# Patient Record
Sex: FEMALE | Race: WHITE | NOT HISPANIC OR LATINO | URBAN - METROPOLITAN AREA
[De-identification: names, ages, dates, MRNs, and addresses within clinical notes are randomized per-mention and may not be internally consistent; named-entity substitution may affect disease eponyms.]

---

## 2020-12-19 ENCOUNTER — EMERGENCY (EMERGENCY)
Facility: HOSPITAL | Age: 60
LOS: 0 days | Discharge: HOME | End: 2020-12-19
Attending: EMERGENCY MEDICINE | Admitting: EMERGENCY MEDICINE
Payer: MEDICAID

## 2020-12-19 VITALS
RESPIRATION RATE: 20 BRPM | HEART RATE: 108 BPM | SYSTOLIC BLOOD PRESSURE: 165 MMHG | TEMPERATURE: 97 F | OXYGEN SATURATION: 96 % | DIASTOLIC BLOOD PRESSURE: 89 MMHG | HEIGHT: 62 IN

## 2020-12-19 VITALS
RESPIRATION RATE: 20 BRPM | SYSTOLIC BLOOD PRESSURE: 158 MMHG | TEMPERATURE: 97 F | OXYGEN SATURATION: 97 % | HEART RATE: 92 BPM | DIASTOLIC BLOOD PRESSURE: 67 MMHG
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
## 2020-12-19 DIAGNOSIS — F17.200 NICOTINE DEPENDENCE, UNSPECIFIED, UNCOMPLICATED: ICD-10-CM

## 2020-12-19 DIAGNOSIS — Z23 ENCOUNTER FOR IMMUNIZATION: ICD-10-CM

## 2020-12-19 DIAGNOSIS — S80.211A ABRASION, RIGHT KNEE, INITIAL ENCOUNTER: ICD-10-CM

## 2020-12-19 DIAGNOSIS — J45.909 UNSPECIFIED ASTHMA, UNCOMPLICATED: ICD-10-CM

## 2020-12-19 DIAGNOSIS — J44.9 CHRONIC OBSTRUCTIVE PULMONARY DISEASE, UNSPECIFIED: ICD-10-CM
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
## 2020-12-19 DIAGNOSIS — Z88.0 ALLERGY STATUS TO PENICILLIN: ICD-10-CM
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
## 2020-12-19 DIAGNOSIS — M25.569 PAIN IN UNSPECIFIED KNEE: ICD-10-CM

## 2020-12-19 PROCEDURE — 73562 X-RAY EXAM OF KNEE 3: CPT | Mod: 26,RT

## 2020-12-19 PROCEDURE — 99284 EMERGENCY DEPT VISIT MOD MDM: CPT

## 2020-12-19 PROCEDURE — 73590 X-RAY EXAM OF LOWER LEG: CPT | Mod: 26,RT

## 2020-12-19 PROCEDURE — 73552 X-RAY EXAM OF FEMUR 2/>: CPT | Mod: 26,RT

## 2020-12-19 RX ORDER — TETANUS TOXOID, REDUCED DIPHTHERIA TOXOID AND ACELLULAR PERTUSSIS VACCINE, ADSORBED 5; 2.5; 8; 8; 2.5 [IU]/.5ML; [IU]/.5ML; UG/.5ML; UG/.5ML; UG/.5ML
0.5 SUSPENSION INTRAMUSCULAR ONCE
Refills: 0 | Status: COMPLETED | OUTPATIENT
Start: 2020-12-19 | End: 2020-12-19

## 2020-12-19 RX ADMIN — TETANUS TOXOID, REDUCED DIPHTHERIA TOXOID AND ACELLULAR PERTUSSIS VACCINE, ADSORBED 0.5 MILLILITER(S): 5; 2.5; 8; 8; 2.5 SUSPENSION INTRAMUSCULAR at 12:03

## 2020-12-19 NOTE — ED PROVIDER NOTE - CLINICAL SUMMARY MEDICAL DECISION MAKING FREE TEXT BOX
Fall while walking and landing on right knee. Abrasion to knee, good gait. Td. x-ray neg for fracture.

## 2020-12-19 NOTE — ED ADULT NURSE NOTE - NSIMPLEMENTINTERV_GEN_ALL_ED
Implemented All Fall Risk Interventions:  [place] to call system. Call bell, personal items and telephone within reach. Instruct patient to call for assistance. Room bathroom lighting operational. Non-slip footwear when patient is off stretcher. Physically safe environment: no spills, clutter or unnecessary equipment. Stretcher in lowest position, wheels locked, appropriate side rails in place. Provide visual cue, wrist band, yellow gown, etc. Monitor gait and stability. Monitor for mental status changes and reorient to person, place, and time. Review medications for side effects contributing to fall risk. Reinforce activity limits and safety measures with patient and family.

## 2020-12-19 NOTE — ED PROVIDER NOTE - ATTENDING CONTRIBUTION TO CARE
Slipped and fell onto the right knee. Able to walk. On exam abrasion to the right knee. No issues with range of motion and normal gait. No other injury.

## 2020-12-19 NOTE — ED PROVIDER NOTE - OBJECTIVE STATEMENT
59 y/o female with a PMH of COPD, asthma, current smoker, and blood cell abnormality? (in the process of f/u with hematologist) presents to the ED for evaluation of right knee pain s/p slipping on black ice at a gas station and landing on her right knee. pt report she got up with the help of her  and has been walking around since the fall. pt cannot recall if she is up to date on tetanus. pt reports chronic sob that improves with her ventolin inhaler. pt reports weight is worse with weight bearing. pt denies numbness, tingling, LOC, head injury, chest pain, sob, abdominal pain, weakness.

## 2020-12-19 NOTE — ED PROVIDER NOTE - NSFOLLOWUPCLINICS_GEN_ALL_ED_FT
[facility]  Orthpedic  [address], [place]   Phone: [phone]  Fax:   Follow Up Time: 1-3 Days

## 2020-12-19 NOTE — ED PROVIDER NOTE - PATIENT PORTAL LINK FT
You can access the FollowMyHealth Patient Portal offered by Health system by registering at the following website: [email or web address]. By joining [facility]’s FollowMyHealth portal, you will also be able to view your health information using other applications (apps) compatible with our system.

## 2020-12-19 NOTE — ED PROVIDER NOTE - PHYSICAL EXAMINATION
Physical Exam    Vital Signs: I have reviewed the initial vital signs.  Constitutional: well-nourished, appears stated age, no acute distress  Eyes: Conjunctiva pink, Sclera clear  Cardiovascular: S1 and S2, regular rate, regular rhythm, well-perfused extremities, radial pulses equal and 2+ b/l.   Respiratory: unlabored respiratory effort, clear to auscultation bilaterally no wheezing, rales and rhonchi. pt is speaking full sentences. no accessory muscle use.   Gastrointestinal: soft, non-tender, nondistended abdomen, no pulsatile mass, normal bowl sounds, no rebound, no guarding, no organomegaly  Musculoskeletal: FROM of b/l upper and lower extremities. (+) tenderness over the right patella, and pain with extension of right knee. no effusion. negative yecenia. no joint laxity.   Integumentary: warm, dry, no rash. (+) abrasion to right knee.   Neurologic: awake, alert, cranial nerves II-XII grossly intact, extremities’ motor and sensory functions grossly intact. finger to nose intact. steady gait.   Psychiatric: appropriate mood, appropriate affect

## 2020-12-19 NOTE — ED PROVIDER NOTE - PROGRESS NOTE DETAILS
FF: discussed radiology results. tdap given. abrasion cleansed and dressed. bacitracin given. advised of return precautions discussed at bedside. agreeable to ed and f/u with ortho.

## 2020-12-19 NOTE — ED PROVIDER NOTE - NSFOLLOWUPINSTRUCTIONS_ED_ALL_ED_FT
Knee Pain    WHAT YOU NEED TO KNOW:    Knee pain may start suddenly, or it may be a long-term problem. You may have pain on the side, front, or back of your knee. You may have knee stiffness and swelling. You may hear popping sounds or feel like your knee is giving way or locking up as you walk. You may feel pain when you sit, stand, walk, or climb up and down stairs. Knee pain can be caused by conditions such as obesity, inflammation, or strains or tears in ligaments or tendons.     DISCHARGE INSTRUCTIONS:    Return to the emergency department if:     Your pain is worse, even after treatment.       You cannot bend or straighten your leg completely.       The swelling around your knee does not go down even with treatment.      Your knee is painful and hot to the touch.     Contact your healthcare provider if:     You have questions or concerns about your condition or care.         Medicines: You may need any of the following:     NSAIDs help decrease swelling and pain or fever. This medicine is available with or without a doctor's order. NSAIDs can cause stomach bleeding or kidney problems in certain people. If you take blood thinner medicine, always ask your healthcare provider if NSAIDs are safe for you. Always read the medicine label and follow directions.      Acetaminophen decreases pain and fever. It is available without a doctor's order. Ask how much to take and how often to take it. Follow directions. Read the labels of all other medicines you are using to see if they also contain acetaminophen, or ask your doctor or pharmacist. Acetaminophen can cause liver damage if not taken correctly. Do not use more than 4 grams (4,000 milligrams) total of acetaminophen in one day.       Prescription pain medicine may be given. Ask your healthcare provider how to take this medicine safely. Some prescription pain medicines contain acetaminophen. Do not take other medicines that contain acetaminophen without talking to your healthcare provider. Too much acetaminophen may cause liver damage. Prescription pain medicine may cause constipation. Ask your healthcare provider how to prevent or treat constipation.       Take your medicine as directed. Contact your healthcare provider if you think your medicine is not helping or if you have side effects. Tell him or her if you are allergic to any medicine. Keep a list of the medicines, vitamins, and herbs you take. Include the amounts, and when and why you take them. Bring the list or the pill bottles to follow-up visits. Carry your medicine list with you in case of an emergency.    What you can do to manage your symptoms:     Rest your knee so it can heal. Limit activities that increase your pain. Do low-impact exercises, such as walking or swimming.       Apply ice to help reduce swelling and pain. Use an ice pack, or put crushed ice in a plastic bag. Cover it with a towel before you apply it to your knee. Apply ice for 15 to 20 minutes every hour, or as directed.      Apply compression to help reduce swelling. Use a brace or bandage only as directed.      Elevate your knee to help decrease pain and swelling. Elevate your knee while you are sitting or lying down. Prop your leg on pillows to keep your knee above the level of your heart.      Prevent your knee from moving as directed. Your healthcare provider may put on a cast or splint. You may need to wear a leg brace to stabilize your knee. A leg brace can be adjusted to increase your range of motion as your knee heals.Hinged Knee Braces          What you can do to prevent knee pain:     Maintain a healthy weight. Extra weight increases your risk for knee pain. Ask your healthcare provider how much you should weigh. He or she can help you create a safe weight loss plan if you need to lose weight.      Exercise or train properly. Use the correct equipment for sports. Wear shoes that provide good support. Check your posture often as you exercise, play sports, or train for an event. This can help prevent stress and strain on your knees. Rest between sessions so you do not overwork your knees.    Follow up with your healthcare provider within 24 hours or as directed: You may need follow-up treatments, such as steroid injections to decrease pain. Write down your questions so you remember to ask them during your visits.        © Copyright [employer] 2019 All illustrations and images included in CareNotes are the copyrighted property of [employer] or [employer].     Abrasion  ImageAn abrasion is a cut or a scrape on the outer surface of the skin. An abrasion does not go through all the layers of the skin. It is important to care for your abrasion properly to prevent infection.    What are the causes?  This condition is caused by falling on or gliding across the ground or another surface. When your skin rubs on something, the outer and inner layers of skin may rub off.    What are the signs or symptoms?  The main symptom of this condition is a cut or a scrape. The scrape may be bleeding, or it may appear red or pink. If the abrasion was caused by a fall, there may be a bruise under the cut or scrape.    How is this diagnosed?  An abrasion is diagnosed with a physical exam.    How is this treated?  Treatment for this condition depends on how large and deep the abrasion is. In most cases:    Your abrasion will be cleaned with water and mild soap. This is done to remove any dirt or debris (such as particles of glass or rock) that may be stuck in the wound.  An antibiotic ointment may be applied to the abrasion to help prevent infection.  A bandage (dressing) may be placed on the abrasion to keep it clean.    You may also need a tetanus shot.    Follow these instructions at home:  Medicines     Take or apply over-the-counter and prescription medicines only as told by your health care provider.  If you were prescribed an antibiotic medicine, apply it as told by your health care provider.  Wound care     Clean the wound 2–3 times a day, or as directed by your health care provider. To do this, wash the wound with mild soap and water, rinse off the soap, and pat the wound dry with a clean towel. Do not rub the wound.  Keep the dressing clean and dry as told by your health care provider.  There are many different ways to close and cover a wound. Follow instructions from your health care provider about:    Caring for your wound.  Changing and removing your dressing. You may have to change your dressing one or more times a day, or as directed by your health care provider.    Check your wound every day for signs of infection. Check for:    Redness, particularly a red streak that spreads out from the wound.  Swelling or increased pain.  Warmth.  Fluid, pus, or a bad smell.    If directed, put ice on the injured area to reduce pain and swelling:    Put ice in a plastic bag.   Place a towel between your skin and the bag.   Leave the ice on for 20 minutes, 2–3 times a day.    General instructions     Do not take baths, swim, or use a hot tub until your health care provider says it is okay to do so.  If possible, raise (elevate) the injured area above the level of your heart while you are sitting or lying down. This will reduce pain and swelling.  Keep all follow-up visits as directed by your health care provider. This is important.  Contact a health care provider if:  You received a tetanus shot, and you have swelling, severe pain, redness, or bleeding at the injection site.  Your pain is not controlled with medicine.  You have redness, swelling, or more pain at the site of your wound.  Get help right away if:  You have a red streak spreading away from your wound.  You have a fever.  You have fluid, blood, or pus coming from your wound.  You notice a bad smell coming from your wound or your dressing.  Summary  An abrasion is a cut or a scrape on the outer surface of the skin. An abrasion does not go through all the layers of the skin.  Care for your abrasion properly to prevent infection.  Clean the wound with mild soap and water 2–3 times a day. Follow instructions from your health care provider about taking medicines and changing your bandage (dressing).  Contact your health care provider if you have redness, swelling or more pain in the wound area.  Get help right away if you have a fever or if you have fluid, blood, pus, a bad smell, or a red streak coming from the wound.  This information is not intended to replace advice given to you by your health care provider. Make sure you discuss any questions you have with your health care provider.

## 2020-12-19 NOTE — ED PROVIDER NOTE - CARE PLAN
Principal Discharge DX:	Knee injury, right, initial encounter  Secondary Diagnosis:	Abrasion  Secondary Diagnosis:	Need for Tdap vaccination

## 2020-12-19 NOTE — ED PROVIDER NOTE - CARE PROVIDER_API CALL
Dinesh Mukherjee)  Orthopaedic Surgery  [address] [place] [address]  Phone: [phone]  Fax: [phone]  Follow Up Time: 1-3 Days

## 2024-04-16 ENCOUNTER — INPATIENT (INPATIENT)
Facility: HOSPITAL | Age: 64
LOS: 3 days | Discharge: HOME | DRG: 556 | End: 2024-04-20
Attending: INTERNAL MEDICINE | Admitting: STUDENT IN AN ORGANIZED HEALTH CARE EDUCATION/TRAINING PROGRAM
Payer: MEDICARE

## 2024-04-16 VITALS
OXYGEN SATURATION: 95 % | WEIGHT: 149.91 LBS | TEMPERATURE: 98 F | HEART RATE: 62 BPM | SYSTOLIC BLOOD PRESSURE: 183 MMHG | RESPIRATION RATE: 18 BRPM | DIASTOLIC BLOOD PRESSURE: 109 MMHG

## 2024-04-16 DIAGNOSIS — R53.1 WEAKNESS: ICD-10-CM

## 2024-04-16 LAB
ALBUMIN SERPL ELPH-MCNC: 3.1 G/DL — LOW (ref 3.5–5.2)
ALP SERPL-CCNC: 78 U/L — SIGNIFICANT CHANGE UP (ref 30–115)
ALT FLD-CCNC: 24 U/L — SIGNIFICANT CHANGE UP (ref 0–41)
ANION GAP SERPL CALC-SCNC: 15 MMOL/L — HIGH (ref 7–14)
ANION GAP SERPL CALC-SCNC: 9 MMOL/L — SIGNIFICANT CHANGE UP (ref 7–14)
AST SERPL-CCNC: 50 U/L — HIGH (ref 0–41)
BASOPHILS # BLD AUTO: 0.06 K/UL — SIGNIFICANT CHANGE UP (ref 0–0.2)
BASOPHILS NFR BLD AUTO: 0.5 % — SIGNIFICANT CHANGE UP (ref 0–1)
BILIRUB SERPL-MCNC: 0.5 MG/DL — SIGNIFICANT CHANGE UP (ref 0.2–1.2)
BUN SERPL-MCNC: 16 MG/DL — SIGNIFICANT CHANGE UP (ref 10–20)
BUN SERPL-MCNC: 17 MG/DL — SIGNIFICANT CHANGE UP (ref 10–20)
CALCIUM SERPL-MCNC: 8.5 MG/DL — SIGNIFICANT CHANGE UP (ref 8.4–10.5)
CALCIUM SERPL-MCNC: 9 MG/DL — SIGNIFICANT CHANGE UP (ref 8.4–10.5)
CHLORIDE SERPL-SCNC: 106 MMOL/L — SIGNIFICANT CHANGE UP (ref 98–110)
CHLORIDE SERPL-SCNC: 99 MMOL/L — SIGNIFICANT CHANGE UP (ref 98–110)
CO2 SERPL-SCNC: 23 MMOL/L — SIGNIFICANT CHANGE UP (ref 17–32)
CO2 SERPL-SCNC: 25 MMOL/L — SIGNIFICANT CHANGE UP (ref 17–32)
CREAT SERPL-MCNC: 0.7 MG/DL — SIGNIFICANT CHANGE UP (ref 0.7–1.5)
CREAT SERPL-MCNC: 0.8 MG/DL — SIGNIFICANT CHANGE UP (ref 0.7–1.5)
EGFR: 82 ML/MIN/1.73M2 — SIGNIFICANT CHANGE UP
EGFR: 96 ML/MIN/1.73M2 — SIGNIFICANT CHANGE UP
EOSINOPHIL # BLD AUTO: 0.12 K/UL — SIGNIFICANT CHANGE UP (ref 0–0.7)
EOSINOPHIL NFR BLD AUTO: 1 % — SIGNIFICANT CHANGE UP (ref 0–8)
GLUCOSE SERPL-MCNC: 74 MG/DL — SIGNIFICANT CHANGE UP (ref 70–99)
GLUCOSE SERPL-MCNC: 79 MG/DL — SIGNIFICANT CHANGE UP (ref 70–99)
HCT VFR BLD CALC: 39.2 % — SIGNIFICANT CHANGE UP (ref 37–47)
HGB BLD-MCNC: 14.1 G/DL — SIGNIFICANT CHANGE UP (ref 12–16)
IMM GRANULOCYTES NFR BLD AUTO: 0.3 % — SIGNIFICANT CHANGE UP (ref 0.1–0.3)
LYMPHOCYTES # BLD AUTO: 28.2 % — SIGNIFICANT CHANGE UP (ref 20.5–51.1)
LYMPHOCYTES # BLD AUTO: 3.37 K/UL — SIGNIFICANT CHANGE UP (ref 1.2–3.4)
MAGNESIUM SERPL-MCNC: 2.1 MG/DL — SIGNIFICANT CHANGE UP (ref 1.8–2.4)
MCHC RBC-ENTMCNC: 36 G/DL — SIGNIFICANT CHANGE UP (ref 32–37)
MCHC RBC-ENTMCNC: 38.1 PG — HIGH (ref 27–31)
MCV RBC AUTO: 105.9 FL — HIGH (ref 81–99)
MONOCYTES # BLD AUTO: 0.8 K/UL — HIGH (ref 0.1–0.6)
MONOCYTES NFR BLD AUTO: 6.7 % — SIGNIFICANT CHANGE UP (ref 1.7–9.3)
NEUTROPHILS # BLD AUTO: 7.56 K/UL — HIGH (ref 1.4–6.5)
NEUTROPHILS NFR BLD AUTO: 63.3 % — SIGNIFICANT CHANGE UP (ref 42.2–75.2)
NRBC # BLD: 0 /100 WBCS — SIGNIFICANT CHANGE UP (ref 0–0)
PLATELET # BLD AUTO: 345 K/UL — SIGNIFICANT CHANGE UP (ref 130–400)
PMV BLD: 9.1 FL — SIGNIFICANT CHANGE UP (ref 7.4–10.4)
POTASSIUM SERPL-MCNC: 3.5 MMOL/L — SIGNIFICANT CHANGE UP (ref 3.5–5)
POTASSIUM SERPL-MCNC: 5.8 MMOL/L — HIGH (ref 3.5–5)
POTASSIUM SERPL-SCNC: 3.5 MMOL/L — SIGNIFICANT CHANGE UP (ref 3.5–5)
POTASSIUM SERPL-SCNC: 5.8 MMOL/L — HIGH (ref 3.5–5)
PROT SERPL-MCNC: 7 G/DL — SIGNIFICANT CHANGE UP (ref 6–8)
RBC # BLD: 3.7 M/UL — LOW (ref 4.2–5.4)
RBC # FLD: 13.4 % — SIGNIFICANT CHANGE UP (ref 11.5–14.5)
SODIUM SERPL-SCNC: 137 MMOL/L — SIGNIFICANT CHANGE UP (ref 135–146)
SODIUM SERPL-SCNC: 140 MMOL/L — SIGNIFICANT CHANGE UP (ref 135–146)
TROPONIN T, HIGH SENSITIVITY RESULT: 16 NG/L — HIGH (ref 6–13)
TROPONIN T, HIGH SENSITIVITY RESULT: 17 NG/L — HIGH (ref 6–13)
WBC # BLD: 11.95 K/UL — HIGH (ref 4.8–10.8)
WBC # FLD AUTO: 11.95 K/UL — HIGH (ref 4.8–10.8)

## 2024-04-16 PROCEDURE — 72146 MRI CHEST SPINE W/O DYE: CPT | Mod: MC

## 2024-04-16 PROCEDURE — 97162 PT EVAL MOD COMPLEX 30 MIN: CPT | Mod: GP

## 2024-04-16 PROCEDURE — 83516 IMMUNOASSAY NONANTIBODY: CPT

## 2024-04-16 PROCEDURE — 73030 X-RAY EXAM OF SHOULDER: CPT | Mod: 26,RT

## 2024-04-16 PROCEDURE — 85027 COMPLETE CBC AUTOMATED: CPT

## 2024-04-16 PROCEDURE — 83735 ASSAY OF MAGNESIUM: CPT

## 2024-04-16 PROCEDURE — 97116 GAIT TRAINING THERAPY: CPT | Mod: GP

## 2024-04-16 PROCEDURE — 82085 ASSAY OF ALDOLASE: CPT

## 2024-04-16 PROCEDURE — 36415 COLL VENOUS BLD VENIPUNCTURE: CPT

## 2024-04-16 PROCEDURE — 86803 HEPATITIS C AB TEST: CPT

## 2024-04-16 PROCEDURE — 86038 ANTINUCLEAR ANTIBODIES: CPT

## 2024-04-16 PROCEDURE — 86381 MITOCHONDRIAL ANTIBODY EACH: CPT

## 2024-04-16 PROCEDURE — 83615 LACTATE (LD) (LDH) ENZYME: CPT

## 2024-04-16 PROCEDURE — 80053 COMPREHEN METABOLIC PANEL: CPT

## 2024-04-16 PROCEDURE — 85025 COMPLETE CBC W/AUTO DIFF WBC: CPT

## 2024-04-16 PROCEDURE — 80048 BASIC METABOLIC PNL TOTAL CA: CPT

## 2024-04-16 PROCEDURE — 72170 X-RAY EXAM OF PELVIS: CPT | Mod: 26

## 2024-04-16 PROCEDURE — 72141 MRI NECK SPINE W/O DYE: CPT | Mod: MC

## 2024-04-16 PROCEDURE — 71045 X-RAY EXAM CHEST 1 VIEW: CPT | Mod: 26

## 2024-04-16 PROCEDURE — 82550 ASSAY OF CK (CPK): CPT

## 2024-04-16 PROCEDURE — 99285 EMERGENCY DEPT VISIT HI MDM: CPT | Mod: FS

## 2024-04-16 PROCEDURE — 70450 CT HEAD/BRAIN W/O DYE: CPT | Mod: 26,MC

## 2024-04-16 PROCEDURE — 84100 ASSAY OF PHOSPHORUS: CPT

## 2024-04-16 PROCEDURE — 86235 NUCLEAR ANTIGEN ANTIBODY: CPT

## 2024-04-16 PROCEDURE — 99222 1ST HOSP IP/OBS MODERATE 55: CPT

## 2024-04-16 PROCEDURE — 72148 MRI LUMBAR SPINE W/O DYE: CPT | Mod: MC

## 2024-04-16 RX ORDER — SODIUM CHLORIDE 9 MG/ML
1000 INJECTION INTRAMUSCULAR; INTRAVENOUS; SUBCUTANEOUS ONCE
Refills: 0 | Status: COMPLETED | OUTPATIENT
Start: 2024-04-16 | End: 2024-04-16

## 2024-04-16 RX ORDER — ONDANSETRON 8 MG/1
4 TABLET, FILM COATED ORAL EVERY 8 HOURS
Refills: 0 | Status: DISCONTINUED | OUTPATIENT
Start: 2024-04-16 | End: 2024-04-20

## 2024-04-16 RX ORDER — ACETAMINOPHEN 500 MG
650 TABLET ORAL EVERY 6 HOURS
Refills: 0 | Status: DISCONTINUED | OUTPATIENT
Start: 2024-04-16 | End: 2024-04-20

## 2024-04-16 RX ORDER — ALBUTEROL 90 UG/1
2 AEROSOL, METERED ORAL EVERY 6 HOURS
Refills: 0 | Status: DISCONTINUED | OUTPATIENT
Start: 2024-04-16 | End: 2024-04-20

## 2024-04-16 RX ORDER — LANOLIN ALCOHOL/MO/W.PET/CERES
3 CREAM (GRAM) TOPICAL AT BEDTIME
Refills: 0 | Status: DISCONTINUED | OUTPATIENT
Start: 2024-04-16 | End: 2024-04-20

## 2024-04-16 RX ORDER — HEPARIN SODIUM 5000 [USP'U]/ML
5000 INJECTION INTRAVENOUS; SUBCUTANEOUS EVERY 12 HOURS
Refills: 0 | Status: DISCONTINUED | OUTPATIENT
Start: 2024-04-16 | End: 2024-04-20

## 2024-04-16 RX ORDER — INFLUENZA VIRUS VACCINE 15; 15; 15; 15 UG/.5ML; UG/.5ML; UG/.5ML; UG/.5ML
0.7 SUSPENSION INTRAMUSCULAR ONCE
Refills: 0 | Status: COMPLETED | OUTPATIENT
Start: 2024-04-16 | End: 2024-04-16

## 2024-04-16 RX ADMIN — SODIUM CHLORIDE 2000 MILLILITER(S): 9 INJECTION INTRAMUSCULAR; INTRAVENOUS; SUBCUTANEOUS at 14:51

## 2024-04-16 NOTE — H&P ADULT - HISTORY OF PRESENT ILLNESS
Patient is a 64yo female PMHx COPD, asthma, HLD presenting to the ER with "weakness" for the past couple months. Patient states she has been feeling lower extremity weakness with since January.  Weakness has progressed over time, leading to mechanical falls that now occur "every day." Patient remains conscious with all falls except for her first one months ago.  She states she has hit her head on multiple occasions.  She struggles to ambulate at home unless she has assistance, and leading to more bed-bound state.  Patient also admits to increased lightheadedness. "Something weird in my head" and "feels like theres water in her legs."  She denies headaches, changes in vision, chest pain.     Patient has not been to doctor for this before.  Boyfriend called ambulance today for hospital evaluation   Patient is a 62yo F w/ PMHx of COPD - not on home O2, asthma, presenting to the ER with weakness and recurrent falls for the past couple of months. Patient states she has been feeling worsening lower extremity weakness since January, leading to recurrent mechanical falls that now occur "every other day." Patient remains conscious with all falls except for her first one months ago.  She states she has hit her head on multiple occasions.  She struggles to ambulate at home unless she has assistance, and leading to more bed-bound state.  Patient also admits to increased lightheadedness. Reports feeling like "Something weird in my head". Reports her boyfriend called EMS today as he is unable to care for her anymore and is worried about her. Endorses pain at the tail bone. She denies headaches, changes in vision, chest pain, nausea, vomiting, sob, abdominal pain, changes in BMs, or any urinary sxs, LE numbness, tingling.

## 2024-04-16 NOTE — ED PROVIDER NOTE - ATTENDING APP SHARED VISIT CONTRIBUTION OF CARE
63yF p/w frequent falls at home with generalized weakness.  Also c/o pain but cannot explain exactly where.

## 2024-04-16 NOTE — H&P ADULT - NSHPSOCIALHISTORY_GEN_ALL_CORE
Patient lives with her boyfriend.  She   She struggles to ambulate on her own.  She uses a chair for the shower.  She does not use a walker.    Patient reports smoking ppd over 50 years.  Reports used to be a heavy drinker, quit a year ago.  Reports history of pot, cocaine, and marijuana use but has quit use.  Currently denies drug or alcohol abuse Patient resides at home with her boyfriend.    She struggles to ambulate on her own.  She uses a chair for the shower.  She does not use a walker.    Patient reports smoking 1 ppd over 50 years which she quit a month ago, now smoker 1-2 cigarettes per day.  Reports used to be a heavy drinker, quit a year ago.  Reports history of using pot, cocaine, and marijuana but quit a few years ago.  Currently denies drug or alcohol abuse

## 2024-04-16 NOTE — H&P ADULT - NSHPLABSRESULTS_GEN_ALL_CORE
14.1   11.95 )-----------( 345      ( 16 Apr 2024 14:24 )             39.2       04-16    137  |  99  |  17  ----------------------------<  79  5.8<H>   |  23  |  0.8    Ca    9.0      16 Apr 2024 14:24  Mg     2.1     04-16    TPro  7.0  /  Alb  3.1<L>  /  TBili  0.5  /  DBili  x   /  AST  50<H>  /  ALT  24  /  AlkPhos  78  04-16       Magnesium: 2.1 mg/dL (04-16-24 @ 14:24)    Urinalysis Basic - ( 16 Apr 2024 14:24 )    Color: x / Appearance: x / SG: x / pH: x  Gluc: 79 mg/dL / Ketone: x  / Bili: x / Urobili: x   Blood: x / Protein: x / Nitrite: x   Leuk Esterase: x / RBC: x / WBC x   Sq Epi: x / Non Sq Epi: x / Bacteria: x    Lactate Trend    from: Xray Pelvis AP only (04.16.24 @ 14:54) >  IMPRESSION:  No acute fracture or dislocation.  Degenerative changes of the lower lumbar spine.    from: Xray Chest 1 View- PORTABLE-Urgent (04.16.24 @ 14:54) >  Impression:  Soft tissue density medial to the right proximal humerus of unknown   significance. Recommend follow-up dedicated right shoulder radiograph.    from: CT Head No Cont (04.16.24 @ 15:20) >  IMPRESSION:  1.  No evidence of acute intracranial pathology.  2.  Severe chronic microvascular changes.

## 2024-04-16 NOTE — ED PROVIDER NOTE - PROGRESS NOTE DETAILS
Patient is admitted for ambulatory dysfunction. Pt is aware of the plan and agrees. Pt has been endorsed to hospitalist.

## 2024-04-16 NOTE — H&P ADULT - NSHPPHYSICALEXAM_GEN_ALL_CORE
PHYSICAL EXAM:   Vital Signs:  Vital Signs Last 24 Hrs  T(C): 36.7 (16 Apr 2024 14:07), Max: 36.7 (16 Apr 2024 14:07)  T(F): 98 (16 Apr 2024 14:07), Max: 98 (16 Apr 2024 14:07)  HR: 71 (16 Apr 2024 18:56) (62 - 71)  BP: 164/94 (16 Apr 2024 18:56) (164/94 - 183/109)  RR: 18 (16 Apr 2024 18:56) (18 - 18)  SpO2: 98% (16 Apr 2024 18:56) (95% - 98%)    Parameters below as of 16 Apr 2024 18:56  Patient On (Oxygen Delivery Method): room air    GENERAL:  Patient is a 64 yo female laying in hospital bed.   HEENT:  NC/AT,  conjunctivae clear and pink, no nodules, adenopathy  CHEST:  Full & symmetric excursion, no increased effort, breath sounds clear  HEART:  Regular rhythm, S1, S2, no murmur/rub/S3/S4, no abdominal bruit, no edema  ABDOMEN:  Soft, non-tender, non-distended, normoactive bowel sounds,  no masses   EXTEREMITIES:  no cyanosis or edema.  Tremor noted of the upper extremities, more prominent LUE.    SKIN:  No rash/erythema/ecchymoses/petechiae/wounds/abscess/warm/dry  NEURO:  Awake, alert, and oriented x 3 PHYSICAL EXAM:   Vital Signs:  Vital Signs Last 24 Hrs  T(C): 36.7 (16 Apr 2024 14:07), Max: 36.7 (16 Apr 2024 14:07)  T(F): 98 (16 Apr 2024 14:07), Max: 98 (16 Apr 2024 14:07)  HR: 71 (16 Apr 2024 18:56) (62 - 71)  BP: 164/94 (16 Apr 2024 18:56) (164/94 - 183/109)  RR: 18 (16 Apr 2024 18:56) (18 - 18)  SpO2: 98% (16 Apr 2024 18:56) (95% - 98%)    Parameters below as of 16 Apr 2024 18:56  Patient On (Oxygen Delivery Method): room air    GENERAL:  Patient is a 62 yo female laying in hospital bed.   HEENT:  NC/AT,  conjunctivae clear and pink, no nodules, adenopathy  CHEST:  Full & symmetric excursion, no increased effort, breath sounds clear  HEART:  Regular rhythm, S1, S2  ABDOMEN:  Soft, non-tender, non-distended, normoactive bowel sounds,  no masses   EXTREMITIES:  no cyanosis or edema. No swelling in LEs, no calf tenderness, peripheral pulses present. Resulting upper extremities tremors noted, more prominent LUE.    MSK: no spinal tenderness   SKIN:  No rash/erythema/ecchymoses/petechiae/wounds/abscess/warm/dry  NEURO:  Awake, alert, and oriented x 3

## 2024-04-16 NOTE — ED PROVIDER NOTE - DIFFERENTIAL DIAGNOSIS
fx, dislocation, sprain, strain, SHAHAB, infection, electrolyte abnormality, ACS Differential Diagnosis

## 2024-04-16 NOTE — PATIENT PROFILE ADULT - OVER THE PAST TWO WEEKS, HAVE YOU FELT LITTLE INTEREST OR PLEASURE IN DOING THINGS?
Darwin Ashby,    I had the opportunity to review your recent labs and a summary of your labs reads as follows:    Your complete blood counts show no sign of anemia, normal white blood cell count and platelets.  Your comprehensive metabolic panel showed stable impaired renal function, normal liver function, and normal fasting blood glucose indicating no evidence of diabetes mellitus.  Your fasting lipid panel show  - normal HDL (good) cholesterol -as your goal is greater than 40  - elevated LDL (bad) cholesterol as your goal is less than 160 - rather than starting statin I would recommend a you look into the Mediterranean Diet - typically high in fruits, vegetables, whole grains, beans, nuts, and seeds; include olive oil as an important source of monounsaturated fat; and allow low to moderate wine consumption.  There are also typically low to moderate amounts of fish, poultry, and dairy products, with little red meat in this diet.  - normal triglyceride levels    The 10-year ASCVD risk score (Yoncallaerna ANAYA Jr., et al., 2013) is: 3.6%    Values used to calculate the score:      Age: 57 years      Sex: Female      Is Non- : No      Diabetic: No      Tobacco smoker: No      Systolic Blood Pressure: 140 mmHg      Is BP treated: No      HDL Cholesterol: 71 mg/dL      Total Cholesterol: 256 mg/dL      Sincerely,  Dann Khalil MD
no

## 2024-04-16 NOTE — ED PROVIDER NOTE - OBJECTIVE STATEMENT
63-year-old female with past medical history of COPD and asthma who presents to the ED for evaluation.  Reports that she has been having weakness and multiple falls in the past 3 months and fell against yesterday, so her boyfriend called the ambulance today to send her to the hospital for evaluation.  Also endorses lightheadedness.  Denies fever, shortness of breath, chest pain, nausea, vomiting, abdominal pain, urinary symptoms, and change with bowel movement.

## 2024-04-16 NOTE — ED PROVIDER NOTE - CLINICAL SUMMARY MEDICAL DECISION MAKING FREE TEXT BOX
63yF COPD/asthma p/w weakness, lightheadedness and now fall.  Pt nontoxic and w/o gross deformity to suggest traumatic injury.  Not on a/c.  Pt struggling to ambulate 2/2 weakness.  Labs w/ mildly elevated troponin.  Imaging with ?shoulder fx on CXR, not seen on dedicated shoulder xray as per interpretation of ED attending Klerman.  EKG w/o STEMI as per ED attending Klerman interpretation.  Given difficulty ambulating and concern for home safety, will adm for further care.

## 2024-04-16 NOTE — H&P ADULT - ASSESSMENT
Weakness  Falls  - CT head notes severe chronic microvascular changes.  Neuro consult in or outpatient   - Fall risk protocol   - PT follow up  - Social work follow up    Elevated Potassium  - hemolyzed lab work.  Follow up labs     Troponin Elevation   - Elevations noted, 17 and 16    COPD, Asthma  - continue albuterol  - not on home O2       Patient is a 64yo F w/ PMHx of COPD - not on home O2, asthma, presenting to the ER with weakness and recurrent falls for the past couple of months. Patient states she has been feeling worsening lower extremity weakness since January, leading to recurrent mechanical falls that now occur "every other day." Patient remains conscious with all falls except for her first one months ago.  She states she has hit her head on multiple occasions.  She struggles to ambulate at home unless she has assistance, and leading to more bed-bound state.  Patient also admits to increased lightheadedness. Reports feeling like "Something weird in my head". Reports her boyfriend called EMS today as he is unable to care for her anymore and is worried about her. Endorses pain at the tail bone. She denies headaches, changes in vision, chest pain, nausea, vomiting, sob, abdominal pain, changes in BMs, or any urinary sxs, LE numbness, tingling.     #Ambulatory Dysfucntion  #Weakness  #Recurrent mechanical Falls  - CT head- no acute intracranial pathology, severe chronic microvascular changes.  - XR Pelvis - No acute fx of dislocation degenerative changes on lower spine  - CXR- Prelim: Soft tissue density medial to the right proximal humerus of unknown   significance.  - FU official CXR report  - Tylenol 650mg q6 for mild pain  - Celecoxib 100mg q6 for moderate pain   - PT FU  - Social work FU   - Fall risk protocol  - Consider neurology FU       #Elevated Potassium  - hemolyzed lab work.    - Follow up repeat BMP    #COPD  #Asthma  - continue w/ albuterol q6 prn for sob/wheezing   - not on home O2    #Current smoker  - patient educated on smoking cessation  - refusing nicotine patch     Diet: Regular   Activity: OOB  VTE PPX: Lovenox    Plan Discussed and approved by attending on call.     Patient is a 64yo F w/ PMHx of COPD - not on home O2, asthma, presenting to the ER with weakness and recurrent falls for the past couple of months. Patient states she has been feeling worsening lower extremity weakness since January, leading to recurrent mechanical falls that now occur "every other day." Patient remains conscious with all falls except for her first one months ago.  She states she has hit her head on multiple occasions.  She struggles to ambulate at home unless she has assistance, and leading to more bed-bound state.  Patient also admits to increased lightheadedness. Reports feeling like "Something weird in my head". Reports her boyfriend called EMS today as he is unable to care for her anymore and is worried about her. Endorses pain at the tail bone. She denies headaches, changes in vision, chest pain, nausea, vomiting, sob, abdominal pain, changes in BMs, or any urinary sxs, LE numbness, tingling.     #Ambulatory Dysfucntion  #Weakness  #Recurrent mechanical Falls  - CT head- no acute intracranial pathology, severe chronic microvascular changes.  - XR Pelvis - No acute fx of dislocation degenerative changes on lower spine  - CXR- Prelim: Soft tissue density medial to the right proximal humerus of unknown   significance.  - xray of the right shoulder  - Tylenol 650mg q6 for mild pain  - Celecoxib 100mg q6 for moderate pain   - PT FU  - Social work FU   - Fall risk protocol  - Consider neurology FU       #Elevated Potassium  - hemolyzed lab work.    - Follow up repeat BMP    #COPD  #Asthma  - continue w/ albuterol q6 prn for sob/wheezing   - not on home O2    #Current smoker  - patient educated on smoking cessation  - refusing nicotine patch     Diet: Regular   Activity: OOB  VTE PPX: Lovenox    Plan Discussed and approved by attending on call.

## 2024-04-16 NOTE — PATIENT PROFILE ADULT - FALL HARM RISK - HARM RISK INTERVENTIONS
Assistance with ambulation/Assistance OOB with selected safe patient handling equipment/Communicate Risk of Fall with Harm to all staff/Discuss with provider need for PT consult/Monitor gait and stability/Reinforce activity limits and safety measures with patient and family/Tailored Fall Risk Interventions/Visual Cue: Yellow wristband and red socks/Bed in lowest position, wheels locked, appropriate side rails in place/Call bell, personal items and telephone in reach/Instruct patient to call for assistance before getting out of bed or chair/Non-slip footwear when patient is out of bed/[place] to call system/Physically safe environment - no spills, clutter or unnecessary equipment/Purposeful Proactive Rounding/Room/bathroom lighting operational, light cord in reach

## 2024-04-17 LAB
ANION GAP SERPL CALC-SCNC: 14 MMOL/L — SIGNIFICANT CHANGE UP (ref 7–14)
BUN SERPL-MCNC: 15 MG/DL — SIGNIFICANT CHANGE UP (ref 10–20)
CALCIUM SERPL-MCNC: 8.5 MG/DL — SIGNIFICANT CHANGE UP (ref 8.4–10.5)
CHLORIDE SERPL-SCNC: 103 MMOL/L — SIGNIFICANT CHANGE UP (ref 98–110)
CK SERPL-CCNC: 18 U/L — SIGNIFICANT CHANGE UP (ref 0–225)
CO2 SERPL-SCNC: 23 MMOL/L — SIGNIFICANT CHANGE UP (ref 17–32)
CREAT SERPL-MCNC: 0.7 MG/DL — SIGNIFICANT CHANGE UP (ref 0.7–1.5)
EGFR: 96 ML/MIN/1.73M2 — SIGNIFICANT CHANGE UP
GLUCOSE SERPL-MCNC: 62 MG/DL — LOW (ref 70–99)
HCT VFR BLD CALC: 33 % — LOW (ref 37–47)
HCV AB S/CO SERPL IA: 0.06 COI — SIGNIFICANT CHANGE UP
HCV AB SERPL-IMP: SIGNIFICANT CHANGE UP
HGB BLD-MCNC: 11.7 G/DL — LOW (ref 12–16)
LDH SERPL L TO P-CCNC: 159 — SIGNIFICANT CHANGE UP (ref 50–242)
MAGNESIUM SERPL-MCNC: 2 MG/DL — SIGNIFICANT CHANGE UP (ref 1.8–2.4)
MCHC RBC-ENTMCNC: 35.5 G/DL — SIGNIFICANT CHANGE UP (ref 32–37)
MCHC RBC-ENTMCNC: 38 PG — HIGH (ref 27–31)
MCV RBC AUTO: 107.1 FL — HIGH (ref 81–99)
NRBC # BLD: 0 /100 WBCS — SIGNIFICANT CHANGE UP (ref 0–0)
PHOSPHATE SERPL-MCNC: 3.3 MG/DL — SIGNIFICANT CHANGE UP (ref 2.1–4.9)
PLATELET # BLD AUTO: 327 K/UL — SIGNIFICANT CHANGE UP (ref 130–400)
PMV BLD: 8.8 FL — SIGNIFICANT CHANGE UP (ref 7.4–10.4)
POTASSIUM SERPL-MCNC: 3.6 MMOL/L — SIGNIFICANT CHANGE UP (ref 3.5–5)
POTASSIUM SERPL-SCNC: 3.6 MMOL/L — SIGNIFICANT CHANGE UP (ref 3.5–5)
RBC # BLD: 3.08 M/UL — LOW (ref 4.2–5.4)
RBC # FLD: 13.4 % — SIGNIFICANT CHANGE UP (ref 11.5–14.5)
SODIUM SERPL-SCNC: 140 MMOL/L — SIGNIFICANT CHANGE UP (ref 135–146)
WBC # BLD: 8.58 K/UL — SIGNIFICANT CHANGE UP (ref 4.8–10.8)
WBC # FLD AUTO: 8.58 K/UL — SIGNIFICANT CHANGE UP (ref 4.8–10.8)

## 2024-04-17 PROCEDURE — 99232 SBSQ HOSP IP/OBS MODERATE 35: CPT

## 2024-04-17 NOTE — PHYSICAL THERAPY INITIAL EVALUATION ADULT - GENERAL OBSERVATIONS, REHAB EVAL
10:45-11:15 Chart reviewed. Patient available to be seen for physical therapy, denies pain, confirmed with RN.  Pt rec'd in recliner + Primafit in NAD.

## 2024-04-17 NOTE — PHYSICAL THERAPY INITIAL EVALUATION ADULT - ADDITIONAL COMMENTS
Pt reports she lives with boyfriend in house with 5 LIVE into basement, and no other steps to negotiate. Pt says she amb independently without device PTA. Pt indicated she had a few falls in January, and has been getting weaker since.

## 2024-04-17 NOTE — PHYSICAL THERAPY INITIAL EVALUATION ADULT - PERTINENT HX OF CURRENT PROBLEM, REHAB EVAL
Reason for Admission: weakness  History of Present Illness:   Patient is a 62yo F w/ PMHx of COPD - not on home O2, asthma, presenting to the ER with weakness and recurrent falls for the past couple of months. Patient states she has been feeling worsening lower extremity weakness since January, leading to recurrent mechanical falls that now occur "every other day." Patient remains conscious with all falls except for her first one months ago.  She states she has hit her head on multiple occasions.  She struggles to ambulate at home unless she has assistance, and leading to more bed-bound state.  Patient also admits to increased lightheadedness. Reports feeling like "Something weird in my head". Reports her boyfriend called EMS today as he is unable to care for her anymore and is worried about her. Endorses pain at the tail bone. She denies headaches, changes in vision, chest pain, nausea, vomiting, sob, abdominal pain, changes in BMs, or any urinary sxs, LE numbness, tingling.

## 2024-04-18 LAB
ALBUMIN SERPL ELPH-MCNC: 2.9 G/DL — LOW (ref 3.5–5.2)
ALP SERPL-CCNC: 80 U/L — SIGNIFICANT CHANGE UP (ref 30–115)
ALT FLD-CCNC: 21 U/L — SIGNIFICANT CHANGE UP (ref 0–41)
ANION GAP SERPL CALC-SCNC: 11 MMOL/L — SIGNIFICANT CHANGE UP (ref 7–14)
AST SERPL-CCNC: 31 U/L — SIGNIFICANT CHANGE UP (ref 0–41)
BASOPHILS # BLD AUTO: 0.05 K/UL — SIGNIFICANT CHANGE UP (ref 0–0.2)
BASOPHILS NFR BLD AUTO: 0.5 % — SIGNIFICANT CHANGE UP (ref 0–1)
BILIRUB SERPL-MCNC: 0.6 MG/DL — SIGNIFICANT CHANGE UP (ref 0.2–1.2)
BUN SERPL-MCNC: 12 MG/DL — SIGNIFICANT CHANGE UP (ref 10–20)
CALCIUM SERPL-MCNC: 8.8 MG/DL — SIGNIFICANT CHANGE UP (ref 8.4–10.5)
CHLORIDE SERPL-SCNC: 103 MMOL/L — SIGNIFICANT CHANGE UP (ref 98–110)
CO2 SERPL-SCNC: 27 MMOL/L — SIGNIFICANT CHANGE UP (ref 17–32)
CREAT SERPL-MCNC: 0.6 MG/DL — LOW (ref 0.7–1.5)
EGFR: 100 ML/MIN/1.73M2 — SIGNIFICANT CHANGE UP
EOSINOPHIL # BLD AUTO: 0.14 K/UL — SIGNIFICANT CHANGE UP (ref 0–0.7)
EOSINOPHIL NFR BLD AUTO: 1.5 % — SIGNIFICANT CHANGE UP (ref 0–8)
GLUCOSE SERPL-MCNC: 75 MG/DL — SIGNIFICANT CHANGE UP (ref 70–99)
HCT VFR BLD CALC: 35.6 % — LOW (ref 37–47)
HGB BLD-MCNC: 12.4 G/DL — SIGNIFICANT CHANGE UP (ref 12–16)
IMM GRANULOCYTES NFR BLD AUTO: 0.5 % — HIGH (ref 0.1–0.3)
LYMPHOCYTES # BLD AUTO: 2.73 K/UL — SIGNIFICANT CHANGE UP (ref 1.2–3.4)
LYMPHOCYTES # BLD AUTO: 29.7 % — SIGNIFICANT CHANGE UP (ref 20.5–51.1)
MCHC RBC-ENTMCNC: 34.8 G/DL — SIGNIFICANT CHANGE UP (ref 32–37)
MCHC RBC-ENTMCNC: 37.7 PG — HIGH (ref 27–31)
MCV RBC AUTO: 108.2 FL — HIGH (ref 81–99)
MITOCHONDRIA AB SER-ACNC: SIGNIFICANT CHANGE UP
MONOCYTES # BLD AUTO: 0.69 K/UL — HIGH (ref 0.1–0.6)
MONOCYTES NFR BLD AUTO: 7.5 % — SIGNIFICANT CHANGE UP (ref 1.7–9.3)
NEUTROPHILS # BLD AUTO: 5.52 K/UL — SIGNIFICANT CHANGE UP (ref 1.4–6.5)
NEUTROPHILS NFR BLD AUTO: 60.3 % — SIGNIFICANT CHANGE UP (ref 42.2–75.2)
NRBC # BLD: 0 /100 WBCS — SIGNIFICANT CHANGE UP (ref 0–0)
PLATELET # BLD AUTO: 332 K/UL — SIGNIFICANT CHANGE UP (ref 130–400)
PMV BLD: 8.6 FL — SIGNIFICANT CHANGE UP (ref 7.4–10.4)
POTASSIUM SERPL-MCNC: 3.7 MMOL/L — SIGNIFICANT CHANGE UP (ref 3.5–5)
POTASSIUM SERPL-SCNC: 3.7 MMOL/L — SIGNIFICANT CHANGE UP (ref 3.5–5)
PROT SERPL-MCNC: 5.6 G/DL — LOW (ref 6–8)
RBC # BLD: 3.29 M/UL — LOW (ref 4.2–5.4)
RBC # FLD: 13.1 % — SIGNIFICANT CHANGE UP (ref 11.5–14.5)
SODIUM SERPL-SCNC: 141 MMOL/L — SIGNIFICANT CHANGE UP (ref 135–146)
WBC # BLD: 9.18 K/UL — SIGNIFICANT CHANGE UP (ref 4.8–10.8)
WBC # FLD AUTO: 9.18 K/UL — SIGNIFICANT CHANGE UP (ref 4.8–10.8)

## 2024-04-18 PROCEDURE — 72148 MRI LUMBAR SPINE W/O DYE: CPT | Mod: 26

## 2024-04-18 PROCEDURE — 99232 SBSQ HOSP IP/OBS MODERATE 35: CPT

## 2024-04-18 PROCEDURE — 72146 MRI CHEST SPINE W/O DYE: CPT | Mod: 26

## 2024-04-18 PROCEDURE — 72141 MRI NECK SPINE W/O DYE: CPT | Mod: 26

## 2024-04-18 RX ORDER — NICOTINE POLACRILEX 2 MG
1 GUM BUCCAL DAILY
Refills: 0 | Status: DISCONTINUED | OUTPATIENT
Start: 2024-04-18 | End: 2024-04-20

## 2024-04-19 LAB
ALDOLASE SERPL-CCNC: 5 U/L — SIGNIFICANT CHANGE UP (ref 3.3–10.3)
ENA JO1 AB SER-ACNC: <0.2 AI — SIGNIFICANT CHANGE UP

## 2024-04-19 PROCEDURE — 99232 SBSQ HOSP IP/OBS MODERATE 35: CPT

## 2024-04-19 NOTE — PROGRESS NOTE ADULT - SUBJECTIVE AND OBJECTIVE BOX
62yo F w/ PMHx of COPD - not on home O2, asthma, presenting to the ER with weakness and recurrent falls for the past couple of months.    Today:  Seen at bedside, complains of generalized weakness, numbness and tingling of her hand and legs; denies saddle anesthesia, denies issues with bowel or bladder control.          REVIEW OF SYSTEMS:  Weakness    MEDICATIONS  (STANDING):  heparin   Injectable 5000 Unit(s) SubCutaneous every 12 hours  influenza  Vaccine (HIGH DOSE) 0.7 milliLiter(s) IntraMuscular once    MEDICATIONS  (PRN):  acetaminophen     Tablet .. 650 milliGRAM(s) Oral every 6 hours PRN Temp greater or equal to 38C (100.4F), Mild Pain (1 - 3)  albuterol    90 MICROgram(s) HFA Inhaler 2 Puff(s) Inhalation every 6 hours PRN Shortness of Breath and/or Wheezing  aluminum hydroxide/magnesium hydroxide/simethicone Suspension 30 milliLiter(s) Oral every 4 hours PRN Dyspepsia  melatonin 3 milliGRAM(s) Oral at bedtime PRN Insomnia  ondansetron Injectable 4 milliGRAM(s) IV Push every 8 hours PRN Nausea and/or Vomiting      Allergies  ampicillin (Unknown)        FAMILY HISTORY:  FH: type 2 diabetes        Vital Signs Last 24 Hrs  T(C): 36.7 (17 Apr 2024 12:55), Max: 36.8 (17 Apr 2024 04:54)  T(F): 98 (17 Apr 2024 12:55), Max: 98.2 (17 Apr 2024 04:54)  HR: 68 (17 Apr 2024 12:55) (68 - 86)  BP: 161/92 (17 Apr 2024 12:55) (140/68 - 164/94)  RR: 16 (17 Apr 2024 12:55) (16 - 18)  SpO2: 94% (17 Apr 2024 12:55) (94% - 98%)    Parameters below as of 17 Apr 2024 04:54  Patient On (Oxygen Delivery Method): room air        PHYSICAL EXAM:  GENERAL:  Patient is a 64 yo female laying in hospital bed.   HEENT:  NC/AT,  conjunctivae clear and pink, no nodules, adenopathy  CHEST:  Full & symmetric excursion, no increased effort, breath sounds clear  HEART:  Regular rhythm, S1, S2  ABDOMEN:  Soft, non-tender, non-distended, normoactive bowel sounds,  no masses   EXTREMITIES:  no cyanosis or edema. No swelling in LEs, no calf tenderness, peripheral pulses present. Resulting upper extremities tremors noted, more prominent LUE.    MSK: no spinal tenderness   SKIN:  No rash/erythema/ecchymoses/petechiae/wounds/abscess/warm/dry  NEURO:  Awake, alert, and oriented x 3      LABS:                        11.7   8.58  )-----------( 327      ( 17 Apr 2024 06:15 )             33.0     04-17    140  |  103  |  15  ----------------------------<  62<L>  3.6   |  23  |  0.7    Ca    8.5      17 Apr 2024 06:15  Phos  3.3     04-17  Mg     2.0     04-17    TPro  7.0  /  Alb  3.1<L>  /  TBili  0.5  /  DBili  x   /  AST  50<H>  /  ALT  24  /  AlkPhos  78  04-16      Urinalysis Basic - ( 17 Apr 2024 06:15 )    Color: x / Appearance: x / SG: x / pH: x  Gluc: 62 mg/dL / Ketone: x  / Bili: x / Urobili: x   Blood: x / Protein: x / Nitrite: x   Leuk Esterase: x / RBC: x / WBC x   Sq Epi: x / Non Sq Epi: x / Bacteria: x      
64yo F w/ PMHx of COPD - not on home O2, asthma, presenting to the ER with weakness and recurrent falls for the past couple of months.    Today:  Seen at bedside, still complains of weakness.              REVIEW OF SYSTEMS:  Generalized weakness      MEDICATIONS  (STANDING):  heparin   Injectable 5000 Unit(s) SubCutaneous every 12 hours  influenza  Vaccine (HIGH DOSE) 0.7 milliLiter(s) IntraMuscular once    MEDICATIONS  (PRN):  acetaminophen     Tablet .. 650 milliGRAM(s) Oral every 6 hours PRN Temp greater or equal to 38C (100.4F), Mild Pain (1 - 3)  albuterol    90 MICROgram(s) HFA Inhaler 2 Puff(s) Inhalation every 6 hours PRN Shortness of Breath and/or Wheezing  aluminum hydroxide/magnesium hydroxide/simethicone Suspension 30 milliLiter(s) Oral every 4 hours PRN Dyspepsia  melatonin 3 milliGRAM(s) Oral at bedtime PRN Insomnia  ondansetron Injectable 4 milliGRAM(s) IV Push every 8 hours PRN Nausea and/or Vomiting      Allergies  ampicillin (Unknown)        FAMILY HISTORY:  FH: type 2 diabetes        Vital Signs Last 24 Hrs  T(C): 36.9 (18 Apr 2024 12:12), Max: 36.9 (18 Apr 2024 12:12)  T(F): 98.5 (18 Apr 2024 12:12), Max: 98.5 (18 Apr 2024 12:12)  HR: 80 (18 Apr 2024 12:12) (76 - 83)  BP: 169/74 (18 Apr 2024 12:12) (148/78 - 169/74)  RR: 15 (18 Apr 2024 12:12) (15 - 18)  SpO2: 95% (18 Apr 2024 12:12) (94% - 96%)    Parameters below as of 18 Apr 2024 09:01  Patient On (Oxygen Delivery Method): room air        PHYSICAL EXAM:  GENERAL:  Patient is a 64 yo female laying in hospital bed.   HEENT:  NC/AT,  conjunctivae clear and pink, no nodules, adenopathy  CHEST:  Full & symmetric excursion, no increased effort, breath sounds clear  HEART:  Regular rhythm, S1, S2  ABDOMEN:  Soft, non-tender, non-distended, normoactive bowel sounds,  no masses   EXTREMITIES:  no cyanosis or edema. No swelling in LEs, no calf tenderness, peripheral pulses present. Resulting upper extremities tremors noted, more prominent LUE.    MSK: no spinal tenderness   SKIN:  No rash/erythema/ecchymoses/petechiae/wounds/abscess/warm/dry  NEURO:  Awake, alert, and oriented x 3      LABS:                        12.4   9.18  )-----------( 332      ( 18 Apr 2024 05:51 )             35.6     04-18    141  |  103  |  12  ----------------------------<  75  3.7   |  27  |  0.6<L>    Ca    8.8      18 Apr 2024 05:51  Phos  3.3     04-17  Mg     2.0     04-17    TPro  5.6<L>  /  Alb  2.9<L>  /  TBili  0.6  /  DBili  x   /  AST  31  /  ALT  21  /  AlkPhos  80  04-18      Urinalysis Basic - ( 18 Apr 2024 05:51 )    Color: x / Appearance: x / SG: x / pH: x  Gluc: 75 mg/dL / Ketone: x  / Bili: x / Urobili: x   Blood: x / Protein: x / Nitrite: x   Leuk Esterase: x / RBC: x / WBC x   Sq Epi: x / Non Sq Epi: x / Bacteria: x      
64yo F w/ PMHx of COPD - not on home O2, asthma, presenting to the ER with weakness and recurrent falls for the past couple of months.    Today:  Seen at bedside, no new complaints, states she feels better.              REVIEW OF SYSTEMS:  Generalized weakness      MEDICATIONS  (STANDING):  heparin   Injectable 5000 Unit(s) SubCutaneous every 12 hours  influenza  Vaccine (HIGH DOSE) 0.7 milliLiter(s) IntraMuscular once  nicotine -  14 mG/24Hr(s) Patch 1 Patch Transdermal daily    MEDICATIONS  (PRN):  acetaminophen     Tablet .. 650 milliGRAM(s) Oral every 6 hours PRN Temp greater or equal to 38C (100.4F), Mild Pain (1 - 3)  albuterol    90 MICROgram(s) HFA Inhaler 2 Puff(s) Inhalation every 6 hours PRN Shortness of Breath and/or Wheezing  aluminum hydroxide/magnesium hydroxide/simethicone Suspension 30 milliLiter(s) Oral every 4 hours PRN Dyspepsia  melatonin 3 milliGRAM(s) Oral at bedtime PRN Insomnia  ondansetron Injectable 4 milliGRAM(s) IV Push every 8 hours PRN Nausea and/or Vomiting      Allergies  ampicillin (Unknown)        FAMILY HISTORY:  FH: type 2 diabetes        Vital Signs Last 24 Hrs  T(C): 36.9 (19 Apr 2024 13:06), Max: 36.9 (19 Apr 2024 13:06)  T(F): 98.5 (19 Apr 2024 13:06), Max: 98.5 (19 Apr 2024 13:06)  HR: 88 (19 Apr 2024 13:06) (88 - 102)  BP: 150/76 (19 Apr 2024 13:06) (138/73 - 159/77)  RR: 16 (19 Apr 2024 13:06) (16 - 18)  SpO2: 97% (19 Apr 2024 13:06) (96% - 97%)    Parameters below as of 19 Apr 2024 04:55  Patient On (Oxygen Delivery Method): room air        PHYSICAL EXAM:  GENERAL:  Patient is a 62 yo female laying in hospital bed.   HEENT:  NC/AT,  conjunctivae clear and pink, no nodules, adenopathy  CHEST:  Full & symmetric excursion, no increased effort, breath sounds clear  HEART:  Regular rhythm, S1, S2  ABDOMEN:  Soft, non-tender, non-distended, normoactive bowel sounds,  no masses   EXTREMITIES:  no cyanosis or edema. No swelling in LEs, no calf tenderness, peripheral pulses present. Resulting upper extremities tremors noted, more prominent LUE.    MSK: no spinal tenderness   SKIN:  No rash/erythema/ecchymoses/petechiae/wounds/abscess/warm/dry  NEURO:  Awake, alert, and oriented x 3      LABS:                        12.4   9.18  )-----------( 332      ( 18 Apr 2024 05:51 )             35.6     04-18    141  |  103  |  12  ----------------------------<  75  3.7   |  27  |  0.6<L>    Ca    8.8      18 Apr 2024 05:51    TPro  5.6<L>  /  Alb  2.9<L>  /  TBili  0.6  /  DBili  x   /  AST  31  /  ALT  21  /  AlkPhos  80  04-18      Urinalysis Basic - ( 18 Apr 2024 05:51 )    Color: x / Appearance: x / SG: x / pH: x  Gluc: 75 mg/dL / Ketone: x  / Bili: x / Urobili: x   Blood: x / Protein: x / Nitrite: x   Leuk Esterase: x / RBC: x / WBC x   Sq Epi: x / Non Sq Epi: x / Bacteria: x

## 2024-04-19 NOTE — PROGRESS NOTE ADULT - ASSESSMENT
62yo F w/ PMHx of COPD - not on home O2, asthma, presenting to the ER with weakness and recurrent falls for the past couple of months. Patient states she has been feeling worsening lower extremity weakness since January, leading to recurrent mechanical falls that now occur "every other day." Patient remains conscious with all falls except for her first one months ago.  She states she has hit her head on multiple occasions.  She struggles to ambulate at home unless she has assistance, and leading to more bed-bound state.  Patient also admits to increased lightheadedness. Reports feeling like "Something weird in my head". Reports her boyfriend called EMS today as he is unable to care for her anymore and is worried about her. Endorses pain at the tail bone. She denies headaches, changes in vision, chest pain, nausea, vomiting, sob, abdominal pain, changes in BMs, or any urinary sxs,    #Ambulatory Dysfucntion  #Weakness  #Recurrent mechanical Falls  - CT head- no acute intracranial pathology, severe chronic microvascular changes.  - XR Pelvis - No acute fx of dislocation degenerative changes on lower spine  - CXR- Prelim: Soft tissue density medial to the right proximal humerus of unknown   significance.  - xray of the right shoulder-no Fx  - Tylenol 650mg q6 for mild pain  - Celecoxib 100mg q6 for moderate pain   - PT FU  - Social work FU   - Fall risk protocol  - Will get MR LS, TS, CS (patient reported prior injury to her LS, fell through a porch)  -Polymyositis?  Patient says she has trouble raising from a chair, walking up stairs, reaching above her head.  Will send CK, aldolase, LDH, anti-Elisa, anti SRP, anti Mi-2.    #Elevated Potassium  - hemolyzed lab work, K normal on repeat    #COPD  #Asthma  - continue w/ albuterol q6 prn for sob/wheezing   - not on home O2    #Current smoker  - patient educated on smoking cessation  - refusing nicotine patch     Diet: Regular   Activity: OOB  VTE PPX: Lovenox
62yo F w/ PMHx of COPD - not on home O2, asthma, presenting to the ER with weakness and recurrent falls for the past couple of months. Patient states she has been feeling worsening lower extremity weakness since January, leading to recurrent mechanical falls that now occur "every other day." Patient remains conscious with all falls except for her first one months ago.  She states she has hit her head on multiple occasions.  She struggles to ambulate at home unless she has assistance, and leading to more bed-bound state.  Patient also admits to increased lightheadedness. Reports feeling like "Something weird in my head". Reports her boyfriend called EMS today as he is unable to care for her anymore and is worried about her. Endorses pain at the tail bone. She denies headaches, changes in vision, chest pain, nausea, vomiting, sob, abdominal pain, changes in BMs, or any urinary sxs,    #Ambulatory Dysfucntion  #Weakness  #Recurrent mechanical Falls  - CT head- no acute intracranial pathology, severe chronic microvascular changes.  - XR Pelvis - No acute fx of dislocation degenerative changes on lower spine  - CXR- Prelim: Soft tissue density medial to the right proximal humerus of unknown   significance.  - xray of the right shoulder-no Fx  - Tylenol 650mg q6 for mild pain  - Celecoxib 100mg q6 for moderate pain   - PT FU  - Social work FU   - Fall risk protocol  - MR LS, TS, CS show no acute findings, nothing to explain current symptoms  -Polymyositis?  Patient says she has trouble raising from a chair, walking up stairs, reaching above her head.  Follow aldolase, LDH, anti-Elisa, anti SRP, anti Mi-2.  -More than likely patient is de-conditioned from recent illness.  Will follow rheum work up (neg so far)     #Elevated Potassium  - hemolyzed lab work, K normal on repeat    #COPD  #Asthma  - continue w/ albuterol q6 prn for sob/wheezing   - not on home O2    #Current smoker  - patient educated on smoking cessation  - refusing nicotine patch     Diet: Regular   Activity: OOB  VTE PPX: Lovenox  
62yo F w/ PMHx of COPD - not on home O2, asthma, presenting to the ER with weakness and recurrent falls for the past couple of months. Patient states she has been feeling worsening lower extremity weakness since January, leading to recurrent mechanical falls that now occur "every other day." Patient remains conscious with all falls except for her first one months ago.  She states she has hit her head on multiple occasions.  She struggles to ambulate at home unless she has assistance, and leading to more bed-bound state.  Patient also admits to increased lightheadedness. Reports feeling like "Something weird in my head". Reports her boyfriend called EMS today as he is unable to care for her anymore and is worried about her. Endorses pain at the tail bone. She denies headaches, changes in vision, chest pain, nausea, vomiting, sob, abdominal pain, changes in BMs, or any urinary sxs,    #Ambulatory Dysfucntion  #Weakness  #Recurrent mechanical Falls  - CT head- no acute intracranial pathology, severe chronic microvascular changes.  - XR Pelvis - No acute fx of dislocation degenerative changes on lower spine  - CXR- Prelim: Soft tissue density medial to the right proximal humerus of unknown   significance.  - xray of the right shoulder-no Fx  - Tylenol 650mg q6 for mild pain  - Celecoxib 100mg q6 for moderate pain   - PT FU  - Social work FU   - Fall risk protocol  - Will get MR LS, TS, CS (patient reported prior injury to her LS, fell through a porch)  -Polymyositis?  Patient says she has trouble raising from a chair, walking up stairs, reaching above her head.  Will send CK, aldolase, LDH, anti-Elisa, anti SRP, anti Mi-2.    #Elevated Potassium  - hemolyzed lab work, K normal on repeat    #COPD  #Asthma  - continue w/ albuterol q6 prn for sob/wheezing   - not on home O2    #Current smoker  - patient educated on smoking cessation  - refusing nicotine patch     Diet: Regular   Activity: OOB  VTE PPX: Lovenox

## 2024-04-19 NOTE — GOALS OF CARE CONVERSATION - ADVANCED CARE PLANNING - CONVERSATION DETAILS
Patient wished to remain full code with no limits on care.  She wishes to regain her strength.  She does not like the idea of going to a STR and is interested in home PT, however she will consider STR if absolutely necessary.

## 2024-04-20 ENCOUNTER — TRANSCRIPTION ENCOUNTER (OUTPATIENT)
Age: 64
End: 2024-04-20

## 2024-04-20 VITALS — OXYGEN SATURATION: 96 %

## 2024-04-20 LAB
ANA PAT FLD IF-IMP: ABNORMAL
ANA TITR SER: ABNORMAL

## 2024-04-20 PROCEDURE — 99238 HOSP IP/OBS DSCHRG MGMT 30/<: CPT

## 2024-04-20 RX ORDER — ALBUTEROL 90 UG/1
2 AEROSOL, METERED ORAL
Qty: 0 | Refills: 0 | DISCHARGE

## 2024-04-20 RX ORDER — ALBUTEROL 90 UG/1
2 AEROSOL, METERED ORAL
Qty: 1 | Refills: 0
Start: 2024-04-20 | End: 2024-05-19

## 2024-04-20 NOTE — DISCHARGE NOTE PROVIDER - NSDCCPCAREPLAN_GEN_ALL_CORE_FT
PRINCIPAL DISCHARGE DIAGNOSIS  Diagnosis: Weakness  Assessment and Plan of Treatment: Please work hard with physical therapy and follow with your primary care doctor.

## 2024-04-20 NOTE — DISCHARGE NOTE PROVIDER - CARE PROVIDER_API CALL
Elizabeth [address], Admin - [address]  [place] [address]  Phone: [phone]  Fax: [phone]  Follow Up Time:

## 2024-04-20 NOTE — DISCHARGE NOTE NURSING/CASE MANAGEMENT/SOCIAL WORK - PATIENT PORTAL LINK FT
You can access the FollowMyHealth Patient Portal offered by [facility] by registering at the following website: [email or web address]. By joining [facility]’s FollowMyHealth portal, you will also be able to view your health information using other applications (apps) compatible with our system.

## 2024-04-20 NOTE — DISCHARGE NOTE PROVIDER - HOSPITAL COURSE
62yo F w/ PMHx of COPD - not on home O2, asthma, presenting to the ER with weakness and recurrent falls for the past couple of months. Patient states she has been feeling worsening lower extremity weakness since January, leading to recurrent mechanical falls that now occur "every other day." Patient remains conscious with all falls except for her first one months ago.  She states she has hit her head on multiple occasions.  She struggles to ambulate at home unless she has assistance, and leading to more bed-bound state.  Patient also admits to increased lightheadedness. Reports feeling like "Something weird in my head". Reports her boyfriend called EMS today as he is unable to care for her anymore and is worried about her. Endorses pain at the tail bone. She denies headaches, changes in vision, chest pain, nausea, vomiting, sob, abdominal pain, changes in BMs, or any urinary sxs,    #Ambulatory Dysfucntion, likely deconditioning after recent viral illness (pt has been "sitting around all day" in her basement apartment)  #Weakness  #Recurrent mechanical Falls  - CT head- no acute intracranial pathology, severe chronic microvascular changes.  - XR Pelvis - No acute fx of dislocation degenerative changes on lower spine  - CXR- Prelim: Soft tissue density medial to the right proximal humerus of unknown   significance.  - xray of the right shoulder-no Fx  - MR LS, TS, CS show no acute findings, nothing to explain current symptoms  -Polymyositis-unlikely.  Rheum markers neg to date including aldolase and Elisa-1  -More than likely patient is de-conditioned from recent illness-patient was offered STR but declined and prefers to be DCed home with home program      #COPD  #Asthma  - continue w/ albuterol  - not on home O2    #Current smoker  - patient educated on smoking cessation  - nicotine patch

## 2024-04-20 NOTE — DISCHARGE NOTE NURSING/CASE MANAGEMENT/SOCIAL WORK - NSDCVIVACCINE_GEN_ALL_CORE_FT
Tdap; 19-Dec-2020 12:03; Mel Pugh ([place]); [employer]; [id number] (Exp. Date: 31-Jul-2022); IntraMuscular; Deltoid Left.; 0.5 milliLiter(s); VIS (VIS Published: 09-May-2013, VIS Presented: 19-Dec-2020);

## 2024-04-21 NOTE — CHART NOTE - NSCHARTNOTEFT_GEN_A_CORE
Reviewed labs post-DC, JOSEFINA elevated, likely insignificant (possibly related to recent viral illness) as symptoms not consistent with lupus.  Pt to follow up with PMD as instructed.

## 2024-04-22 PROBLEM — J45.909 UNSPECIFIED ASTHMA, UNCOMPLICATED: Chronic | Status: ACTIVE | Noted: 2024-04-16

## 2024-04-22 PROBLEM — J44.9 CHRONIC OBSTRUCTIVE PULMONARY DISEASE, UNSPECIFIED: Chronic | Status: ACTIVE | Noted: 2024-04-16

## 2024-04-29 DIAGNOSIS — Z53.29 PROCEDURE AND TREATMENT NOT CARRIED OUT BECAUSE OF PATIENT'S DECISION FOR OTHER REASONS: ICD-10-CM

## 2024-04-29 DIAGNOSIS — J45.909 UNSPECIFIED ASTHMA, UNCOMPLICATED: ICD-10-CM

## 2024-04-29 DIAGNOSIS — Z91.81 HISTORY OF FALLING: ICD-10-CM

## 2024-04-29 DIAGNOSIS — Z23 ENCOUNTER FOR IMMUNIZATION: ICD-10-CM

## 2024-04-29 DIAGNOSIS — R29.898 OTHER SYMPTOMS AND SIGNS INVOLVING THE MUSCULOSKELETAL SYSTEM: ICD-10-CM

## 2024-04-29 DIAGNOSIS — E87.5 HYPERKALEMIA: ICD-10-CM

## 2024-04-29 DIAGNOSIS — R53.1 WEAKNESS: ICD-10-CM

## 2024-04-29 DIAGNOSIS — Z88.1 ALLERGY STATUS TO OTHER ANTIBIOTIC AGENTS STATUS: ICD-10-CM

## 2024-04-29 DIAGNOSIS — R26.2 DIFFICULTY IN WALKING, NOT ELSEWHERE CLASSIFIED: ICD-10-CM

## 2024-04-29 DIAGNOSIS — F17.210 NICOTINE DEPENDENCE, CIGARETTES, UNCOMPLICATED: ICD-10-CM

## 2024-04-29 DIAGNOSIS — J44.9 CHRONIC OBSTRUCTIVE PULMONARY DISEASE, UNSPECIFIED: ICD-10-CM

## 2024-05-04 LAB — MI2 AB SER QL: NEGATIVE — SIGNIFICANT CHANGE UP

## 2024-05-08 PROBLEM — Z00.00 ENCOUNTER FOR PREVENTIVE HEALTH EXAMINATION: Status: ACTIVE | Noted: 2024-05-08

## 2024-05-11 LAB — SRP AB SERPL QL: NEGATIVE — SIGNIFICANT CHANGE UP

## 2024-05-22 ENCOUNTER — APPOINTMENT (OUTPATIENT)
Dept: NEUROLOGY | Facility: CLINIC | Age: 64
End: 2024-05-22
Payer: MEDICARE

## 2024-05-22 VITALS
BODY MASS INDEX: 23.04 KG/M2 | HEIGHT: 63 IN | SYSTOLIC BLOOD PRESSURE: 164 MMHG | WEIGHT: 130 LBS | HEART RATE: 77 BPM | DIASTOLIC BLOOD PRESSURE: 84 MMHG

## 2024-05-22 VITALS — BODY MASS INDEX: 23.04 KG/M2 | WEIGHT: 130 LBS | HEIGHT: 63 IN

## 2024-05-22 DIAGNOSIS — R25.1 TREMOR, UNSPECIFIED: ICD-10-CM

## 2024-05-22 PROCEDURE — 99204 OFFICE O/P NEW MOD 45 MIN: CPT

## 2024-05-22 RX ORDER — ALBUTEROL 90 MCG
AEROSOL (GRAM) INHALATION
Refills: 0 | Status: ACTIVE | COMMUNITY

## 2024-05-22 NOTE — PHYSICAL EXAM
[FreeTextEntry1] : NEUROLOGICAL EXAMINATION:  Mental Status: Patient is alert and oriented and able to follow commands coherent and relevant, she does have episodic forgetfulness, but she has good cognitive function.    Cranial Nerves Cranial Nerves:   II, III, IV, VI:Patient has head tremor and voice tremor. She has adequate ocular motor function and facial movement with no paralysis in the face. She does have a pronounced vocal tremor.    V: Normal jaw movements. Normal facial sensation.   VII: Normal facial motor testing.   VIII: Grossly normal hearing bilaterally.   IX, X: Palate moves symmetrically. No dysarthria.   XI: Normal shoulder shrug and sternocleidomastoid power.   XII: Tongue appears normal and protrudes in the midline.      Motor: She has course tremors of the outstretched hands with finger to nose dysmetria bilaterally, she does have cogwheel rigidity in the wrist. Adequate strength in upper and lower extremities.   Muscle Stretch Reflexes (right/left): 2+ symmetrical.      Plantar Responses: Flexor bilaterally.      Coordination: Normal finger to nose and heel to shin testing, no truncal ataxia and no tremor.      Sensation: Normal primary sensation. Normal double simultaneous stimulation.     Gait and Station: unsteady with her tremors she has to use a walker to avoid falling, vibration is present in the legs. The deep tendon refelxes are brisk.

## 2024-05-22 NOTE — HISTORY OF PRESENT ILLNESS
[FreeTextEntry1] : This is a neurologic consultation on Lois Shipley to go to Dr. Burnham. The patient is a 63-year-old woman being seen for a variety of different symptoms. Shes had tremors for most of her adult life involving her extremities, her voice and her head which have not been worked up in the past. She has also had balance problems and fell last year in January and has had to use a walker and she also had problems with balance. She had loss of appetite and lost 30 pounds in January of this year. She has had dizziness and numbness in her fingers, shaking of her arms and legs and tremor in her voice. She has had photic sensitivity and decrease in hearing, headaches and forgetfulness with memory loss and difficulty getting dressed by herself.   Her Ros is positive for blurry vision, hearing deficit and anorexia with weight loss.    She has a history of asthma for which she takes albuterol.

## 2024-05-22 NOTE — ASSESSMENT
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 She also had a CT scan from April which revealed severe microvascular ischemic changes but never had an MRI that she knows about. I suggested that we get a Juan Antonio SPECT scan to rule out Parkinson's disease and an MRI of the brain to confirm whether this white matter abnormality is microvascular disease and I recommended an EMG because of her numbness and imbalance. We will see her again in follow up.     Total clinician time spent today on the patient is 45 minutes including preparing to see the patient, obtaining and/or reviewing and confirming history, performing medically necessary and appropriate examination, counseling and educating the patient and/or family, documenting clinical information in the EHR and communicating and/or referring to other healthcare professionals.    Entered by Luis Alberto Daigle acting as scribe for Dr. Greene.    The documentation recorded by the scribe, in my presence, accurately reflects the service I personally performed, and the decisions made by me with my edits as appropriate.   Tal Greene MD, FAAN, FACP   Diplomate [employer] Board [employer]iatry & Neu[employer].

## 2024-05-29 ENCOUNTER — APPOINTMENT (OUTPATIENT)
Dept: NEUROLOGY | Facility: CLINIC | Age: 64
End: 2024-05-29

## 2024-06-05 ENCOUNTER — APPOINTMENT (OUTPATIENT)
Dept: NEUROLOGY | Facility: CLINIC | Age: 64
End: 2024-06-05

## 2024-06-10 ENCOUNTER — APPOINTMENT (OUTPATIENT)
Dept: INTERNAL MEDICINE | Facility: CLINIC | Age: 64
End: 2024-06-10

## 2024-06-26 ENCOUNTER — APPOINTMENT (OUTPATIENT)
Dept: NEUROLOGY | Facility: CLINIC | Age: 64
End: 2024-06-26

## 2024-10-30 ENCOUNTER — INPATIENT (INPATIENT)
Facility: HOSPITAL | Age: 64
LOS: 10 days | Discharge: HOME | DRG: 556 | End: 2024-11-10
Attending: INTERNAL MEDICINE | Admitting: INTERNAL MEDICINE
Payer: MEDICARE

## 2024-10-30 VITALS
HEART RATE: 93 BPM | SYSTOLIC BLOOD PRESSURE: 151 MMHG | WEIGHT: 132.94 LBS | OXYGEN SATURATION: 97 % | TEMPERATURE: 98 F | RESPIRATION RATE: 18 BRPM | DIASTOLIC BLOOD PRESSURE: 87 MMHG | HEIGHT: 60 IN

## 2024-10-30 DIAGNOSIS — R26.2 DIFFICULTY IN WALKING, NOT ELSEWHERE CLASSIFIED: ICD-10-CM

## 2024-10-30 LAB
ALBUMIN SERPL ELPH-MCNC: 3.6 G/DL — SIGNIFICANT CHANGE UP (ref 3.5–5.2)
ALP SERPL-CCNC: 117 U/L — HIGH (ref 30–115)
ALT FLD-CCNC: 17 U/L — SIGNIFICANT CHANGE UP (ref 0–41)
ANION GAP SERPL CALC-SCNC: 11 MMOL/L — SIGNIFICANT CHANGE UP (ref 7–14)
AST SERPL-CCNC: 29 U/L — SIGNIFICANT CHANGE UP (ref 0–41)
BASOPHILS # BLD AUTO: 0.08 K/UL — SIGNIFICANT CHANGE UP (ref 0–0.2)
BASOPHILS NFR BLD AUTO: 0.8 % — SIGNIFICANT CHANGE UP (ref 0–1)
BILIRUB SERPL-MCNC: 0.5 MG/DL — SIGNIFICANT CHANGE UP (ref 0.2–1.2)
BUN SERPL-MCNC: 19 MG/DL — SIGNIFICANT CHANGE UP (ref 10–20)
CALCIUM SERPL-MCNC: 9.6 MG/DL — SIGNIFICANT CHANGE UP (ref 8.4–10.5)
CHLORIDE SERPL-SCNC: 100 MMOL/L — SIGNIFICANT CHANGE UP (ref 98–110)
CO2 SERPL-SCNC: 27 MMOL/L — SIGNIFICANT CHANGE UP (ref 17–32)
CREAT SERPL-MCNC: 0.8 MG/DL — SIGNIFICANT CHANGE UP (ref 0.7–1.5)
EGFR: 82 ML/MIN/1.73M2 — SIGNIFICANT CHANGE UP
EOSINOPHIL # BLD AUTO: 0.25 K/UL — SIGNIFICANT CHANGE UP (ref 0–0.7)
EOSINOPHIL NFR BLD AUTO: 2.4 % — SIGNIFICANT CHANGE UP (ref 0–8)
ETHANOL SERPL-MCNC: <10 MG/DL — SIGNIFICANT CHANGE UP
GLUCOSE SERPL-MCNC: 84 MG/DL — SIGNIFICANT CHANGE UP (ref 70–99)
HCT VFR BLD CALC: 48.4 % — HIGH (ref 37–47)
HGB BLD-MCNC: 16.9 G/DL — HIGH (ref 12–16)
IMM GRANULOCYTES NFR BLD AUTO: 0.5 % — HIGH (ref 0.1–0.3)
LYMPHOCYTES # BLD AUTO: 2.3 K/UL — SIGNIFICANT CHANGE UP (ref 1.2–3.4)
LYMPHOCYTES # BLD AUTO: 22.3 % — SIGNIFICANT CHANGE UP (ref 20.5–51.1)
MCHC RBC-ENTMCNC: 34.9 G/DL — SIGNIFICANT CHANGE UP (ref 32–37)
MCHC RBC-ENTMCNC: 36.6 PG — HIGH (ref 27–31)
MCV RBC AUTO: 104.8 FL — HIGH (ref 81–99)
MONOCYTES # BLD AUTO: 1.19 K/UL — HIGH (ref 0.1–0.6)
MONOCYTES NFR BLD AUTO: 11.6 % — HIGH (ref 1.7–9.3)
NEUTROPHILS # BLD AUTO: 6.43 K/UL — SIGNIFICANT CHANGE UP (ref 1.4–6.5)
NEUTROPHILS NFR BLD AUTO: 62.4 % — SIGNIFICANT CHANGE UP (ref 42.2–75.2)
NRBC # BLD: 0 /100 WBCS — SIGNIFICANT CHANGE UP (ref 0–0)
PLATELET # BLD AUTO: 312 K/UL — SIGNIFICANT CHANGE UP (ref 130–400)
PMV BLD: 8.7 FL — SIGNIFICANT CHANGE UP (ref 7.4–10.4)
POTASSIUM SERPL-MCNC: 4.2 MMOL/L — SIGNIFICANT CHANGE UP (ref 3.5–5)
POTASSIUM SERPL-SCNC: 4.2 MMOL/L — SIGNIFICANT CHANGE UP (ref 3.5–5)
PROT SERPL-MCNC: 6.5 G/DL — SIGNIFICANT CHANGE UP (ref 6–8)
RBC # BLD: 4.62 M/UL — SIGNIFICANT CHANGE UP (ref 4.2–5.4)
RBC # FLD: 15.1 % — HIGH (ref 11.5–14.5)
SODIUM SERPL-SCNC: 138 MMOL/L — SIGNIFICANT CHANGE UP (ref 135–146)
WBC # BLD: 10.3 K/UL — SIGNIFICANT CHANGE UP (ref 4.8–10.8)
WBC # FLD AUTO: 10.3 K/UL — SIGNIFICANT CHANGE UP (ref 4.8–10.8)

## 2024-10-30 PROCEDURE — 93005 ELECTROCARDIOGRAM TRACING: CPT

## 2024-10-30 PROCEDURE — 70551 MRI BRAIN STEM W/O DYE: CPT | Mod: MC

## 2024-10-30 PROCEDURE — 97530 THERAPEUTIC ACTIVITIES: CPT | Mod: GP

## 2024-10-30 PROCEDURE — 97116 GAIT TRAINING THERAPY: CPT | Mod: GP

## 2024-10-30 PROCEDURE — 73590 X-RAY EXAM OF LOWER LEG: CPT | Mod: LT

## 2024-10-30 PROCEDURE — 73552 X-RAY EXAM OF FEMUR 2/>: CPT | Mod: LT

## 2024-10-30 PROCEDURE — 73502 X-RAY EXAM HIP UNI 2-3 VIEWS: CPT | Mod: LT

## 2024-10-30 PROCEDURE — 83735 ASSAY OF MAGNESIUM: CPT

## 2024-10-30 PROCEDURE — 73562 X-RAY EXAM OF KNEE 3: CPT | Mod: LT

## 2024-10-30 PROCEDURE — 86850 RBC ANTIBODY SCREEN: CPT

## 2024-10-30 PROCEDURE — 70450 CT HEAD/BRAIN W/O DYE: CPT | Mod: 26,MC

## 2024-10-30 PROCEDURE — 85025 COMPLETE CBC W/AUTO DIFF WBC: CPT

## 2024-10-30 PROCEDURE — 99222 1ST HOSP IP/OBS MODERATE 55: CPT

## 2024-10-30 PROCEDURE — 71250 CT THORAX DX C-: CPT | Mod: 26,MC

## 2024-10-30 PROCEDURE — 86901 BLOOD TYPING SEROLOGIC RH(D): CPT

## 2024-10-30 PROCEDURE — 97162 PT EVAL MOD COMPLEX 30 MIN: CPT | Mod: GP

## 2024-10-30 PROCEDURE — 85027 COMPLETE CBC AUTOMATED: CPT

## 2024-10-30 PROCEDURE — 80048 BASIC METABOLIC PNL TOTAL CA: CPT

## 2024-10-30 PROCEDURE — 93970 EXTREMITY STUDY: CPT

## 2024-10-30 PROCEDURE — 80053 COMPREHEN METABOLIC PANEL: CPT

## 2024-10-30 PROCEDURE — 36415 COLL VENOUS BLD VENIPUNCTURE: CPT

## 2024-10-30 PROCEDURE — 97110 THERAPEUTIC EXERCISES: CPT | Mod: GP

## 2024-10-30 PROCEDURE — 94010 BREATHING CAPACITY TEST: CPT

## 2024-10-30 PROCEDURE — 94640 AIRWAY INHALATION TREATMENT: CPT

## 2024-10-30 PROCEDURE — 72148 MRI LUMBAR SPINE W/O DYE: CPT | Mod: MC

## 2024-10-30 PROCEDURE — 72141 MRI NECK SPINE W/O DYE: CPT | Mod: MC

## 2024-10-30 PROCEDURE — 71045 X-RAY EXAM CHEST 1 VIEW: CPT | Mod: 26

## 2024-10-30 PROCEDURE — 86900 BLOOD TYPING SEROLOGIC ABO: CPT

## 2024-10-30 PROCEDURE — 72131 CT LUMBAR SPINE W/O DYE: CPT | Mod: MC

## 2024-10-30 PROCEDURE — 97112 NEUROMUSCULAR REEDUCATION: CPT | Mod: GP

## 2024-10-30 PROCEDURE — 74176 CT ABD & PELVIS W/O CONTRAST: CPT | Mod: 26,MC

## 2024-10-30 PROCEDURE — 72170 X-RAY EXAM OF PELVIS: CPT | Mod: 26

## 2024-10-30 PROCEDURE — 99285 EMERGENCY DEPT VISIT HI MDM: CPT

## 2024-10-30 RX ORDER — ALBUTEROL 90 MCG
2 AEROSOL (GRAM) INHALATION EVERY 6 HOURS
Refills: 0 | Status: DISCONTINUED | OUTPATIENT
Start: 2024-10-30 | End: 2024-11-10

## 2024-10-30 RX ORDER — INFLUENZ VIR VAC TV P-SURF2003 15MCG/.5ML
0.5 SYRINGE (ML) INTRAMUSCULAR ONCE
Refills: 0 | Status: DISCONTINUED | OUTPATIENT
Start: 2024-10-30 | End: 2024-11-10

## 2024-10-30 RX ORDER — LIDOCAINE HYDROCHLORIDE 40 MG/ML
1 SOLUTION TOPICAL DAILY
Refills: 0 | Status: DISCONTINUED | OUTPATIENT
Start: 2024-10-30 | End: 2024-11-10

## 2024-10-30 RX ORDER — PANTOPRAZOLE SODIUM 40 MG/1
40 TABLET, DELAYED RELEASE ORAL
Refills: 0 | Status: DISCONTINUED | OUTPATIENT
Start: 2024-10-30 | End: 2024-11-10

## 2024-10-30 RX ORDER — MORPHINE SULFATE 30 MG/1
4 TABLET, EXTENDED RELEASE ORAL ONCE
Refills: 0 | Status: COMPLETED | OUTPATIENT
Start: 2024-10-30 | End: 2024-10-30

## 2024-10-30 RX ORDER — ACETAMINOPHEN 500 MG
650 TABLET ORAL EVERY 6 HOURS
Refills: 0 | Status: DISCONTINUED | OUTPATIENT
Start: 2024-10-30 | End: 2024-11-06

## 2024-10-30 RX ORDER — HEPARIN SODIUM 10000 [USP'U]/ML
5000 INJECTION INTRAVENOUS; SUBCUTANEOUS EVERY 8 HOURS
Refills: 0 | Status: DISCONTINUED | OUTPATIENT
Start: 2024-10-30 | End: 2024-11-10

## 2024-10-30 RX ORDER — MORPHINE SULFATE 30 MG/1
2 TABLET, EXTENDED RELEASE ORAL EVERY 6 HOURS
Refills: 0 | Status: DISCONTINUED | OUTPATIENT
Start: 2024-10-30 | End: 2024-11-02

## 2024-10-30 RX ADMIN — HEPARIN SODIUM 5000 UNIT(S): 10000 INJECTION INTRAVENOUS; SUBCUTANEOUS at 21:08

## 2024-10-30 RX ADMIN — MORPHINE SULFATE 2 MILLIGRAM(S): 30 TABLET, EXTENDED RELEASE ORAL at 18:44

## 2024-10-30 NOTE — ED ADULT TRIAGE NOTE - CHIEF COMPLAINT QUOTE
Pt complaining of hip pain and shoulder pain after having a fall at home last week. Denies blood thinners or LOC.

## 2024-10-30 NOTE — ED PROVIDER NOTE - ATTENDING APP SHARED VISIT CONTRIBUTION OF CARE
63-year-old female past medical history noted including COPD, hypertension, history of tremor presents for evaluation s/p fall 1 week ago.  Patient states he tripped and fell and has been having pain to her left groin area.  Patient states that is becoming more difficult to get herself dressed and walk and has had to resume using her walker.  Patient did not hit her head at that time.  On exam patient in NAD, AAOx3, positive trauma, no midline vertebral tenderness, lungs with equal air entry bilateral, abdomen soft nontender, hips nontender, good range of motion

## 2024-10-30 NOTE — ED ADULT NURSE NOTE - NS ED NURSE RECORD ANOTHER HT AND WT
Detail Level: Zone
Plan: Recommend SPF 30+ broad spectrum, reapply every 2 hours or after sweating/swimming
Yes

## 2024-10-30 NOTE — PATIENT PROFILE ADULT - FUNCTIONAL ASSESSMENT - BASIC MOBILITY 6.
LOV in July of 2020. Will approve 90 day supply of medication and forward chart to schedulers.
3 = A little assistance

## 2024-10-30 NOTE — H&P ADULT - NSHPLABSRESULTS_GEN_ALL_CORE
16.9   10.30 )-----------( 312      ( 30 Oct 2024 14:15 )             48.4     138  |  100  |  19  ----------------------------<  84  4.2   |  27  |  0.8    Ca    9.6      30 Oct 2024 14:15  TPro  6.5  /  Alb  3.6  /  TBili  0.5  /  DBili  x   /  AST  29  /  ALT  17  /  AlkPhos  117[H]  10-30        Urinalysis Basic - ( 30 Oct 2024 14:15 )  Color: x / Appearance: x / SG: x / pH: x  Gluc: 84 mg/dL / Ketone: x  / Bili: x / Urobili: x   Blood: x / Protein: x / Nitrite: x   Leuk Esterase: x / RBC: x / WBC x   Sq Epi: x / Non Sq Epi: x / Bacteria: x    RADIOLOGY:  CT Chest No Cont (10.30.24 @ 15:11)   IMPRESSION:  There is a 1.9 cm angiomyolipoma in the upper pole of the right kidney  No evidence of thoracic, abdominal or pelvic visceral injury, laceration,   or hematoma.  There are fractures of the left fourth through seventh ribs and the right   posterior tenth rib of indeterminate age. Several of these fractures   appear chronic but acute fractures cannot be excluded. Correlate with the   clinical findings.  Compression deformity of the superior endplate of L2 of indeterminate age.  SIA KING MD; Attending Interventional Radiologist  This document has been electronically signed. Oct 30 2024  4:22PM    CT Head No Cont (10.30.24 @ 14:46)   IMPRESSION:  1.  No evidence of acute intracranial pathology. Stable exam.  2.  Stable severe chronic microvascular changes.  LE HERNANDEZ MD; Attending Radiologist  This document has been electronically signed. Oct 30 2024  3:05PM     Xray Chest 1 View AP/PA (10.30.24 @ 14:14)     Impression:  Multiple displaced left rib fractures  ОЛЬГА LYON MD; Attending Radiologist  This document has been electronically signed. Oct 30 2024  2:42PM

## 2024-10-30 NOTE — ED PROVIDER NOTE - OBJECTIVE STATEMENT
Pt is a 62y/o female with a pmhx of copd not on home 02, htn, hld, here for eval of chronic pain s/p mechanical trip and fall 1 week ago. Pt has now had to use a walker which has since become difficulty which prompted visit. Pt denies head trauma, LOC, Weakness, numbness

## 2024-10-30 NOTE — ED PROVIDER NOTE - CLINICAL SUMMARY MEDICAL DECISION MAKING FREE TEXT BOX
Imaging obtained.  X-ray films interpreted by me.  No acute fracture of pelvis.  There appears to be fracture of the ribs.  We and CT scan of the chest at that time.  Patient with multiple fractures which appear chronic.  Discussed with patient's results.  Patient states that from fractures likely occurred about a month ago.  Patient has no pain in the area.  Patient will require admission at this time secondary to inability to care for herself/ambulate

## 2024-10-30 NOTE — ED PROVIDER NOTE - PHYSICAL EXAMINATION
VITAL SIGNS: I have reviewed nursing notes and confirm.  CONSTITUTIONAL: chronically ill-appearing  SKIN:  skin exam is warm and dry, no acute rash.    HEAD: Normocephalic; atraumatic.  EYES conjunctiva and sclera clear.  ENT: No nasal discharge; airway clear.  CARD: S1, S2 normal; no murmurs, gallops, or rubs. Regular rate and rhythm.   RESP: No wheezes, rales or rhonchi.  ABD: Normal bowel sounds; soft; non-distended; non-tender  EXT: no midline spinal tenderness Normal ROM.  No clubbing, cyanosis or edema.   NEURO: Alert, oriented, grossly unremarkable

## 2024-10-30 NOTE — H&P ADULT - NSHPPHYSICALEXAM_GEN_ALL_CORE
VITALS:   T(C): 36.8 (10-30-24 @ 13:17), Max: 36.8 (10-30-24 @ 13:17)  HR: 93 (10-30-24 @ 13:17) (93 - 93)  BP: 151/87 (10-30-24 @ 13:17) (151/87 - 151/87)  RR: 18 (10-30-24 @ 13:17) (18 - 18)  SpO2: 97% (10-30-24 @ 13:17) (97% - 97%)    GENERAL: NAD, lying in bed comfortably  HEAD:  Atraumatic, Normocephalic  EYES: EOMI, PERRLA, conjunctiva and sclera clear  ENT: Moist mucous membranes  NECK: Supple  CHEST/LUNG: Clear to auscultation bilaterally; No wheezing, or rubs. Unlabored respirations  HEART: Regular rate and rhythm; no rubs, or gallops  ABDOMEN: Bowel sounds present; Soft, Nontender, Nondistended  EXTREMITIES:  No LE edema bilaterally  NERVOUS SYSTEM:  Alert & Oriented X3, speech clear  MSK: no midline spinal tenderness, decreased left leg elevation due to pain  SKIN: No rashes or lesions

## 2024-10-30 NOTE — H&P ADULT - NS ATTEND BILL GEN_ALL_CORE
Attending to bill [No, patient denies ideation or behavior] : No, patient denies ideation or behavior [Low acute suicide risk] : Low acute suicide risk [Yes] : Safety Plan completed/updated (for individuals at risk): Yes

## 2024-10-30 NOTE — H&P ADULT - ASSESSMENT
63 year old female with PMH of COPD and asthma  (not on home O2), active smoker, hx multiple falls  presents to ED c/o weakness and difficulty ambulating since last week.     # Ambulatory dysfunction s/p mechanical fall  # Bilateral Rib fractures  #Compression deformity of the superior endplate of L2 of indeterminate age  - admit to medicine  - PT/OT consult  - pain control  - incentive spirometry    # Polycythemia  - Hg/hct 16.9/ 48 (was 12.3/38 in 4/24)  - will repeat in Am    # Right kidney 1.9 cm angiomyolipoma  - noted on CT  - outpatient follow up with urology    # COPD/Asthma  - not on home O2  - continue albuterol PRN    # Hx smoking  - refused nicotine patch/gum  - smoking cessation counseling done      DVT ppx: heparin  Gi ppx: ppi 63 year old female with PMH of COPD and asthma  (not on home O2), active smoker, hx multiple falls  presents to ED c/o weakness and difficulty ambulating since last week.     # Ambulatory dysfunction s/p mechanical fall  # Bilateral Rib fractures  #Compression deformity of the superior endplate of L2 of indeterminate age  - admit to medicine  - PT consult  - neurology consult  - pain control  - incentive spirometry    # Polycythemia  - Hg/hct 16.9/ 48 (was 12.3/38 in 4/24)  - will repeat in Am    # Right kidney 1.9 cm angiomyolipoma  - noted on CT  - outpatient follow up with urology    # COPD/Asthma  - not on home O2  - continue albuterol PRN    # Hx smoking  - refused nicotine patch/gum  - smoking cessation counseling done      DVT ppx: heparin  Gi ppx: ppi

## 2024-10-30 NOTE — H&P ADULT - NS ATTEND OPT1A GEN_ALL_CORE
History/Medical decision making How Severe Is Your Skin Lesion?: moderate Has Your Skin Lesion Been Treated?: not been treated Is This A New Presentation, Or A Follow-Up?: Skin Lesion
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
## 2024-10-30 NOTE — H&P ADULT - NS ATTEND AMEND GEN_ALL_CORE FT
Patient presents with ambulatory dysfunction.  This patient is known to me and was discharged in April of this year.  She was admitted at that time with similar complaints and her issues were thought to be secondary to having been sedentary after prolonged viral illness.  She followed with a neurologist as an outpatient and was to undergo EMG but was not able to keep her appointment.  -Will consult neurology on this admission   -PT eval  -Patient might need rehab placement pending clinical course.

## 2024-10-30 NOTE — PATIENT PROFILE ADULT - FALL HARM RISK - HARM RISK INTERVENTIONS
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
## 2024-10-31 LAB
ANION GAP SERPL CALC-SCNC: 15 MMOL/L — HIGH (ref 7–14)
BUN SERPL-MCNC: 16 MG/DL — SIGNIFICANT CHANGE UP (ref 10–20)
CALCIUM SERPL-MCNC: 9.6 MG/DL — SIGNIFICANT CHANGE UP (ref 8.4–10.5)
CHLORIDE SERPL-SCNC: 101 MMOL/L — SIGNIFICANT CHANGE UP (ref 98–110)
CO2 SERPL-SCNC: 24 MMOL/L — SIGNIFICANT CHANGE UP (ref 17–32)
CREAT SERPL-MCNC: 0.8 MG/DL — SIGNIFICANT CHANGE UP (ref 0.7–1.5)
EGFR: 82 ML/MIN/1.73M2 — SIGNIFICANT CHANGE UP
GLUCOSE SERPL-MCNC: 71 MG/DL — SIGNIFICANT CHANGE UP (ref 70–99)
HCT VFR BLD CALC: 49.2 % — HIGH (ref 37–47)
HGB BLD-MCNC: 17 G/DL — HIGH (ref 12–16)
MCHC RBC-ENTMCNC: 34.6 G/DL — SIGNIFICANT CHANGE UP (ref 32–37)
MCHC RBC-ENTMCNC: 36.2 PG — HIGH (ref 27–31)
MCV RBC AUTO: 104.7 FL — HIGH (ref 81–99)
NRBC # BLD: 0 /100 WBCS — SIGNIFICANT CHANGE UP (ref 0–0)
PLATELET # BLD AUTO: 305 K/UL — SIGNIFICANT CHANGE UP (ref 130–400)
PMV BLD: 8.6 FL — SIGNIFICANT CHANGE UP (ref 7.4–10.4)
POTASSIUM SERPL-MCNC: 4.4 MMOL/L — SIGNIFICANT CHANGE UP (ref 3.5–5)
POTASSIUM SERPL-SCNC: 4.4 MMOL/L — SIGNIFICANT CHANGE UP (ref 3.5–5)
RBC # BLD: 4.7 M/UL — SIGNIFICANT CHANGE UP (ref 4.2–5.4)
RBC # FLD: 15.1 % — HIGH (ref 11.5–14.5)
SODIUM SERPL-SCNC: 140 MMOL/L — SIGNIFICANT CHANGE UP (ref 135–146)
WBC # BLD: 8.59 K/UL — SIGNIFICANT CHANGE UP (ref 4.8–10.8)
WBC # FLD AUTO: 8.59 K/UL — SIGNIFICANT CHANGE UP (ref 4.8–10.8)

## 2024-10-31 PROCEDURE — 72141 MRI NECK SPINE W/O DYE: CPT | Mod: 26

## 2024-10-31 PROCEDURE — 99232 SBSQ HOSP IP/OBS MODERATE 35: CPT

## 2024-10-31 PROCEDURE — 99223 1ST HOSP IP/OBS HIGH 75: CPT

## 2024-10-31 PROCEDURE — 70551 MRI BRAIN STEM W/O DYE: CPT | Mod: 26

## 2024-10-31 RX ADMIN — MORPHINE SULFATE 2 MILLIGRAM(S): 30 TABLET, EXTENDED RELEASE ORAL at 12:15

## 2024-10-31 RX ADMIN — LIDOCAINE HYDROCHLORIDE 1 PATCH: 40 SOLUTION TOPICAL at 19:11

## 2024-10-31 RX ADMIN — HEPARIN SODIUM 5000 UNIT(S): 10000 INJECTION INTRAVENOUS; SUBCUTANEOUS at 05:01

## 2024-10-31 RX ADMIN — MORPHINE SULFATE 2 MILLIGRAM(S): 30 TABLET, EXTENDED RELEASE ORAL at 10:10

## 2024-10-31 RX ADMIN — PANTOPRAZOLE SODIUM 40 MILLIGRAM(S): 40 TABLET, DELAYED RELEASE ORAL at 05:01

## 2024-10-31 RX ADMIN — LIDOCAINE HYDROCHLORIDE 1 PATCH: 40 SOLUTION TOPICAL at 12:16

## 2024-10-31 NOTE — PHYSICAL THERAPY INITIAL EVALUATION ADULT - PERTINENT HX OF CURRENT PROBLEM, REHAB EVAL
63 year old female with PMH of COPD and asthma  (not on home O2), active smoker, hx multiple falls  presents to ED c/o weakness and difficulty ambulating since last week.
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
## 2024-10-31 NOTE — CONSULT NOTE ADULT - ASSESSMENT
63y F PMH COPD, active smoker presented to ED s/p fall with persistent pain in legs causing difficulty walking. Initial walking difficulties started in January and continued through April, until improved with course of physical therapy. She also have chronic tremors since childhood, most likely essential tremor. Walking difficulties could be attributed to remote neurovascular event v neuro degenerative process of the cerebellum. Unlikely parkinson's/ALS.    Recommendations  - MRI Brain with and without gadolinium  - MRI C-spine with and without gadolinium   - Physical Therapy   - Medical management per primary team    Discussed with Dr Donna pena

## 2024-10-31 NOTE — PROGRESS NOTE ADULT - ASSESSMENT
63 year old female with PMH of COPD and asthma  (not on home O2), active smoker, hx multiple falls  presents to ED c/o weakness and difficulty ambulating since last week.     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

## 2024-10-31 NOTE — PHYSICAL THERAPY INITIAL EVALUATION ADULT - GENERAL OBSERVATIONS, REHAB EVAL
PT 10:05 - 10:35. Pt was seen for PT IE. chart thoroughly reviewed. RN Mary Ellen was aware. Pt agreed to be seen. Pt was found laying in bed in semi edge position in no apparent distress. +hep lock +primafit

## 2024-10-31 NOTE — CONSULT NOTE ADULT - NS ATTEND AMEND GEN_ALL_CORE FT
64 yo RHF w/ h/o COPD, chronic bilateral tremor since childhood, ataxia requiring walker since Jan 2024 initial onset after reported LOC followed by persistent ataxia over 4 months evaluated for spinal issues in April improved with PT followed up with outside neurologist pending outpt w/u including ALIA-scan and EMG/NCS currently with no obvious focal neurologic deficits after mechanical fall.  Has isolated L groin and proximal LE pain ? musculoskeletal. Has some hyperreflexia UE>LE without obvious parkinsonian symptoms.  Acute onset with slow improvement in gait more suggestive of vascular process.  Unclear if current mechanical fall is part of prior sequelae or isolated event.  Recommend r/o central etiology.

## 2024-10-31 NOTE — PHYSICAL THERAPY INITIAL EVALUATION ADULT - ADDITIONAL COMMENTS
Pt lives in a downstairs apartment with her boyfriend. There are 3 LIVE down with no rails. Pt states she uses a fence on the L side to assist. There are no stairs inside. Pt states she needed a RW to ambulate PTA. Pt states she is retired on disability. Pt has RW

## 2024-10-31 NOTE — PROGRESS NOTE ADULT - SUBJECTIVE AND OBJECTIVE BOX
63 year old female with PMH of COPD and asthma  (not on home O2), active smoker, hx multiple falls  presents to ED c/o weakness and difficulty ambulating since last week.     Today:  Seen at bedside, no new complaints.              REVIEW OF SYSTEMS:  No new complaints      MEDICATIONS  (STANDING):  heparin   Injectable 5000 Unit(s) SubCutaneous every 8 hours  influenza  Vaccine (HIGH DOSE) 0.5 milliLiter(s) IntraMuscular once  lidocaine   4% Patch 1 Patch Transdermal daily  pantoprazole    Tablet 40 milliGRAM(s) Oral before breakfast    MEDICATIONS  (PRN):  acetaminophen     Tablet .. 650 milliGRAM(s) Oral every 6 hours PRN Temp greater or equal to 38C (100.4F), Mild Pain (1 - 3)  albuterol    90 MICROgram(s) HFA Inhaler 2 Puff(s) Inhalation every 6 hours PRN Shortness of Breath and/or Wheezing  morphine  - Injectable 2 milliGRAM(s) IV Push every 6 hours PRN Severe Pain (7 - 10)  oxycodone    5 mG/acetaminophen 325 mG 1 Tablet(s) Oral every 6 hours PRN Moderate Pain (4 - 6)      Allergies  ampicillin (Unknown)        FAMILY HISTORY:  FH: type 2 diabetes        Vital Signs Last 24 Hrs  T(C): 36.8 (31 Oct 2024 14:42), Max: 36.8 (30 Oct 2024 20:03)  T(F): 98.2 (31 Oct 2024 14:42), Max: 98.2 (30 Oct 2024 20:03)  HR: 85 (31 Oct 2024 14:42) (71 - 88)  BP: 157/86 (31 Oct 2024 14:42) (136/78 - 160/86)  RR: 18 (31 Oct 2024 04:46) (18 - 18)  SpO2: 91% (31 Oct 2024 14:42) (91% - 98%)    Parameters below as of 31 Oct 2024 04:46  Patient On (Oxygen Delivery Method): room air        PHYSICAL EXAM:  GENERAL: NAD, lying in bed comfortably  HEAD:  Atraumatic, Normocephalic  EYES: EOMI, PERRLA, conjunctiva and sclera clear  ENT: Moist mucous membranes  NECK: Supple  CHEST/LUNG: Clear to auscultation bilaterally; No wheezing, or rubs. Unlabored respirations  HEART: Regular rate and rhythm; no rubs, or gallops  ABDOMEN: Bowel sounds present; Soft, Nontender, Nondistended  EXTREMITIES:  No LE edema bilaterally  NERVOUS SYSTEM:  Alert & Oriented X3, speech clear  MSK: no midline spinal tenderness, decreased left leg elevation due to pain  SKIN: No rashes or lesions      LABS:                        17.0   8.59  )-----------( 305      ( 31 Oct 2024 08:44 )             49.2     10-31    140  |  101  |  16  ----------------------------<  71  4.4   |  24  |  0.8    Ca    9.6      31 Oct 2024 08:44    TPro  6.5  /  Alb  3.6  /  TBili  0.5  /  DBili  x   /  AST  29  /  ALT  17  /  AlkPhos  117[H]  10-30      Urinalysis Basic - ( 31 Oct 2024 08:44 )    Color: x / Appearance: x / SG: x / pH: x  Gluc: 71 mg/dL / Ketone: x  / Bili: x / Urobili: x   Blood: x / Protein: x / Nitrite: x   Leuk Esterase: x / RBC: x / WBC x   Sq Epi: x / Non Sq Epi: x / Bacteria: x

## 2024-11-01 PROCEDURE — 99232 SBSQ HOSP IP/OBS MODERATE 35: CPT

## 2024-11-01 RX ORDER — NICOTINE POLACRILEX 4 MG/1
4 GUM, CHEWING ORAL
Refills: 0 | Status: DISCONTINUED | OUTPATIENT
Start: 2024-11-01 | End: 2024-11-01

## 2024-11-01 RX ORDER — NICOTINE POLACRILEX 4 MG/1
1 GUM, CHEWING ORAL DAILY
Refills: 0 | Status: DISCONTINUED | OUTPATIENT
Start: 2024-11-01 | End: 2024-11-07

## 2024-11-01 RX ADMIN — MORPHINE SULFATE 2 MILLIGRAM(S): 30 TABLET, EXTENDED RELEASE ORAL at 05:08

## 2024-11-01 RX ADMIN — LIDOCAINE HYDROCHLORIDE 1 PATCH: 40 SOLUTION TOPICAL at 12:51

## 2024-11-01 RX ADMIN — HEPARIN SODIUM 5000 UNIT(S): 10000 INJECTION INTRAVENOUS; SUBCUTANEOUS at 13:00

## 2024-11-01 NOTE — PROGRESS NOTE ADULT - SUBJECTIVE AND OBJECTIVE BOX
63 year old female with PMH of COPD and asthma  (not on home O2), active smoker, hx multiple falls  presents to ED c/o weakness and difficulty ambulating since last week.     Today:  Seen at bedside, no new complaints.              REVIEW OF SYSTEMS:  No new complaints      MEDICATIONS  (STANDING):  heparin   Injectable 5000 Unit(s) SubCutaneous every 8 hours  influenza  Vaccine (HIGH DOSE) 0.5 milliLiter(s) IntraMuscular once  lidocaine   4% Patch 1 Patch Transdermal daily  nicotine -   7 mG/24Hr(s) Patch 1 Patch Transdermal daily  pantoprazole    Tablet 40 milliGRAM(s) Oral before breakfast    MEDICATIONS  (PRN):  acetaminophen     Tablet .. 650 milliGRAM(s) Oral every 6 hours PRN Temp greater or equal to 38C (100.4F), Mild Pain (1 - 3)  albuterol    90 MICROgram(s) HFA Inhaler 2 Puff(s) Inhalation every 6 hours PRN Shortness of Breath and/or Wheezing  morphine  - Injectable 2 milliGRAM(s) IV Push every 6 hours PRN Severe Pain (7 - 10)  oxycodone    5 mG/acetaminophen 325 mG 1 Tablet(s) Oral every 6 hours PRN Moderate Pain (4 - 6)      Allergies  ampicillin (Unknown)        FAMILY HISTORY:  FH: type 2 diabetes        Vital Signs Last 24 Hrs  T(C): 36.8 (01 Nov 2024 13:12), Max: 36.9 (01 Nov 2024 04:34)  T(F): 98.3 (01 Nov 2024 13:12), Max: 98.4 (01 Nov 2024 04:34)  HR: 78 (01 Nov 2024 13:12) (76 - 82)  BP: 142/88 (01 Nov 2024 13:12) (108/67 - 142/88)  RR: 18 (01 Nov 2024 13:12) (18 - 18)  SpO2: 95% (01 Nov 2024 13:12) (92% - 95%)    Parameters below as of 01 Nov 2024 13:12  Patient On (Oxygen Delivery Method): room air        PHYSICAL EXAM:  GENERAL: NAD, lying in bed comfortably  HEAD:  Atraumatic, Normocephalic  EYES: EOMI, PERRLA, conjunctiva and sclera clear  ENT: Moist mucous membranes  NECK: Supple  CHEST/LUNG: Clear to auscultation bilaterally; No wheezing, or rubs. Unlabored respirations  HEART: Regular rate and rhythm; no rubs, or gallops  ABDOMEN: Bowel sounds present; Soft, Nontender, Nondistended  EXTREMITIES:  No LE edema bilaterally  NERVOUS SYSTEM:  Alert & Oriented X3, speech clear  MSK: no midline spinal tenderness, decreased left leg elevation due to pain  SKIN: No rashes or lesions      LABS:                        17.0   8.59  )-----------( 305      ( 31 Oct 2024 08:44 )             49.2     10-31    140  |  101  |  16  ----------------------------<  71  4.4   |  24  |  0.8    Ca    9.6      31 Oct 2024 08:44        Urinalysis Basic - ( 31 Oct 2024 08:44 )    Color: x / Appearance: x / SG: x / pH: x  Gluc: 71 mg/dL / Ketone: x  / Bili: x / Urobili: x   Blood: x / Protein: x / Nitrite: x   Leuk Esterase: x / RBC: x / WBC x   Sq Epi: x / Non Sq Epi: x / Bacteria: x

## 2024-11-01 NOTE — PROGRESS NOTE ADULT - ASSESSMENT
63 year old female with PMH of COPD and asthma  (not on home O2), active smoker, hx multiple falls  presents to ED c/o weakness and difficulty ambulating since last week.     # Ambulatory dysfunction s/p mechanical fall  # Bilateral Rib fractures  #Compression deformity of the superior endplate of L2 of indeterminate age  - PT consult-will need STR  - neurology consult appreciated-Patient can follow up outpatient with neurologist, Dr. Greene, for previously ordered EMG and ALIA scan.  - pain control  -MRI brain:  IMPRESSION:  No evidence for acute intracranial pathology.  Nonspecific scattered white matter signal abnormalities statistically   reflecting moderate chronic microvascular type changes though other   etiologies including demyelination, sequela migraine headaches or chronic   infection/inflammation could be considered.   MRI C spine:  Limited and incomplete examination due to patient early termination and   motion.  No evidence for abnormal spinal cord signal although the spinal cord is   suboptimally visualized on sagittal imaging.  Multilevel degenerative changes contributing to moderate to severe   bilateral foraminal narrowing, suboptimally evaluated.    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

## 2024-11-02 PROCEDURE — 99232 SBSQ HOSP IP/OBS MODERATE 35: CPT

## 2024-11-02 RX ORDER — METHOCARBAMOL 500 MG/1
500 TABLET ORAL THREE TIMES A DAY
Refills: 0 | Status: DISCONTINUED | OUTPATIENT
Start: 2024-11-02 | End: 2024-11-10

## 2024-11-02 RX ORDER — ONDANSETRON HYDROCHLORIDE 2 MG/ML
4 INJECTION, SOLUTION INTRAMUSCULAR; INTRAVENOUS ONCE
Refills: 0 | Status: COMPLETED | OUTPATIENT
Start: 2024-11-02 | End: 2024-11-02

## 2024-11-02 RX ADMIN — METHOCARBAMOL 500 MILLIGRAM(S): 500 TABLET ORAL at 21:11

## 2024-11-02 RX ADMIN — ONDANSETRON HYDROCHLORIDE 4 MILLIGRAM(S): 2 INJECTION, SOLUTION INTRAMUSCULAR; INTRAVENOUS at 12:29

## 2024-11-02 RX ADMIN — HEPARIN SODIUM 5000 UNIT(S): 10000 INJECTION INTRAVENOUS; SUBCUTANEOUS at 21:11

## 2024-11-02 NOTE — PROGRESS NOTE ADULT - SUBJECTIVE AND OBJECTIVE BOX
63 year old female with PMH of COPD and asthma  (not on home O2), active smoker, hx multiple falls  presents to ED c/o weakness and difficulty ambulating since last week.     Today:  Seen at bedside, complains of pain which makes it difficult for her to walk.              REVIEW OF SYSTEMS:  LE pain      MEDICATIONS  (STANDING):  heparin   Injectable 5000 Unit(s) SubCutaneous every 8 hours  influenza  Vaccine (HIGH DOSE) 0.5 milliLiter(s) IntraMuscular once  lidocaine   4% Patch 1 Patch Transdermal daily  methocarbamol 500 milliGRAM(s) Oral three times a day  nicotine -   7 mG/24Hr(s) Patch 1 Patch Transdermal daily  pantoprazole    Tablet 40 milliGRAM(s) Oral before breakfast    MEDICATIONS  (PRN):  acetaminophen     Tablet .. 650 milliGRAM(s) Oral every 6 hours PRN Temp greater or equal to 38C (100.4F), Mild Pain (1 - 3)  albuterol    90 MICROgram(s) HFA Inhaler 2 Puff(s) Inhalation every 6 hours PRN Shortness of Breath and/or Wheezing  morphine  - Injectable 2 milliGRAM(s) IV Push every 6 hours PRN Severe Pain (7 - 10)  oxycodone    5 mG/acetaminophen 325 mG 1 Tablet(s) Oral every 6 hours PRN Moderate Pain (4 - 6)      Allergies  ampicillin (Unknown)      FAMILY HISTORY:  FH: type 2 diabetes        Vital Signs Last 24 Hrs  T(C): 37.1 (02 Nov 2024 13:54), Max: 37.1 (02 Nov 2024 13:54)  T(F): 98.7 (02 Nov 2024 13:54), Max: 98.7 (02 Nov 2024 13:54)  HR: 87 (02 Nov 2024 13:54) (74 - 87)  BP: 111/71 (02 Nov 2024 13:54) (111/71 - 129/80)  RR: 18 (02 Nov 2024 13:54) (18 - 18)  SpO2: 91% (02 Nov 2024 13:54) (90% - 93%)    Parameters below as of 02 Nov 2024 13:54  Patient On (Oxygen Delivery Method): room air        PHYSICAL EXAM:  GENERAL: NAD, lying in bed comfortably  HEAD:  Atraumatic, Normocephalic  EYES: EOMI, PERRLA, conjunctiva and sclera clear  ENT: Moist mucous membranes  NECK: Supple  CHEST/LUNG: Clear to auscultation bilaterally; No wheezing, or rubs. Unlabored respirations  HEART: Regular rate and rhythm; no rubs, or gallops  ABDOMEN: Bowel sounds present; Soft, Nontender, Nondistended  EXTREMITIES:  No LE edema bilaterally  NERVOUS SYSTEM:  Alert & Oriented X3, speech clear  MSK: no midline spinal tenderness, decreased left leg elevation due to pain  SKIN: No rashes or lesions

## 2024-11-02 NOTE — PROGRESS NOTE ADULT - ASSESSMENT
63 year old female with PMH of COPD and asthma  (not on home O2), active smoker, hx multiple falls  presents to ED c/o weakness and difficulty ambulating since last week.     # Ambulatory dysfunction s/p mechanical fall  # Bilateral Rib fractures  #Compression deformity of the superior endplate of L2 of indeterminate age  - PT consult-will need STR  - neurology consult appreciated-Patient can follow up outpatient with neurologist, Dr. Greene, for previously ordered EMG and ALIA scan.  - pain control  -MRI brain:  IMPRESSION:  No evidence for acute intracranial pathology.  Nonspecific scattered white matter signal abnormalities statistically   reflecting moderate chronic microvascular type changes though other   etiologies including demyelination, sequela migraine headaches or chronic   infection/inflammation could be considered.   MRI C spine:  Limited and incomplete examination due to patient early termination and   motion.  No evidence for abnormal spinal cord signal although the spinal cord is   suboptimally visualized on sagittal imaging.  Multilevel degenerative changes contributing to moderate to severe   bilateral foraminal narrowing, suboptimally evaluated.  -Neuro consult appreciated, MR Brain/C-spine reviewed. Patient can follow up outpatient with neurologist, Dr. Greene, for previously ordered EMG and ALIA scan.  -Adjust analgesia, added Robaxin    # Polycythemia  - Hg/hct 16.9/ 48 (was 12.3/38 in 4/24), continue to monitor    # Right kidney 1.9 cm angiomyolipoma  - noted on CT  - outpatient follow up with urology    # COPD/Asthma  - not on home O2  - continue albuterol PRN    # Hx smoking  - refused nicotine patch/gum  - smoking cessation counseling done      DVT ppx: heparin  Gi ppx: ppi  DC to STR

## 2024-11-03 LAB
ANISOCYTOSIS BLD QL: SLIGHT — SIGNIFICANT CHANGE UP
BASOPHILS # BLD AUTO: 0.06 K/UL — SIGNIFICANT CHANGE UP (ref 0–0.2)
BASOPHILS NFR BLD AUTO: 0.9 % — SIGNIFICANT CHANGE UP (ref 0–1)
EOSINOPHIL # BLD AUTO: 0.34 K/UL — SIGNIFICANT CHANGE UP (ref 0–0.7)
EOSINOPHIL NFR BLD AUTO: 4.9 % — SIGNIFICANT CHANGE UP (ref 0–8)
HCT VFR BLD CALC: 50.3 % — HIGH (ref 37–47)
HGB BLD-MCNC: 17.4 G/DL — HIGH (ref 12–16)
IMM GRANULOCYTES NFR BLD AUTO: 0.4 % — HIGH (ref 0.1–0.3)
LYMPHOCYTES # BLD AUTO: 1.68 K/UL — SIGNIFICANT CHANGE UP (ref 1.2–3.4)
LYMPHOCYTES # BLD AUTO: 24 % — SIGNIFICANT CHANGE UP (ref 20.5–51.1)
MACROCYTES BLD QL: SLIGHT — SIGNIFICANT CHANGE UP
MCHC RBC-ENTMCNC: 34.6 G/DL — SIGNIFICANT CHANGE UP (ref 32–37)
MCHC RBC-ENTMCNC: 36.5 PG — HIGH (ref 27–31)
MCV RBC AUTO: 105.5 FL — HIGH (ref 81–99)
MONOCYTES # BLD AUTO: 0.7 K/UL — HIGH (ref 0.1–0.6)
MONOCYTES NFR BLD AUTO: 10 % — HIGH (ref 1.7–9.3)
NEUTROPHILS # BLD AUTO: 4.2 K/UL — SIGNIFICANT CHANGE UP (ref 1.4–6.5)
NEUTROPHILS NFR BLD AUTO: 59.8 % — SIGNIFICANT CHANGE UP (ref 42.2–75.2)
NRBC # BLD: 0 /100 WBCS — SIGNIFICANT CHANGE UP (ref 0–0)
PLAT MORPH BLD: NORMAL — SIGNIFICANT CHANGE UP
PLATELET # BLD AUTO: 335 K/UL — SIGNIFICANT CHANGE UP (ref 130–400)
PLATELET COUNT - ESTIMATE: NORMAL — SIGNIFICANT CHANGE UP
PMV BLD: 8.8 FL — SIGNIFICANT CHANGE UP (ref 7.4–10.4)
RBC # BLD: 4.77 M/UL — SIGNIFICANT CHANGE UP (ref 4.2–5.4)
RBC # FLD: 14.6 % — HIGH (ref 11.5–14.5)
RBC BLD AUTO: ABNORMAL
WBC # BLD: 7.01 K/UL — SIGNIFICANT CHANGE UP (ref 4.8–10.8)
WBC # FLD AUTO: 7.01 K/UL — SIGNIFICANT CHANGE UP (ref 4.8–10.8)

## 2024-11-03 PROCEDURE — 73502 X-RAY EXAM HIP UNI 2-3 VIEWS: CPT | Mod: 26,LT

## 2024-11-03 PROCEDURE — 93970 EXTREMITY STUDY: CPT | Mod: 26

## 2024-11-03 PROCEDURE — 99232 SBSQ HOSP IP/OBS MODERATE 35: CPT

## 2024-11-03 PROCEDURE — 73562 X-RAY EXAM OF KNEE 3: CPT | Mod: 26,LT

## 2024-11-03 PROCEDURE — 73590 X-RAY EXAM OF LOWER LEG: CPT | Mod: 26,LT

## 2024-11-03 PROCEDURE — 73552 X-RAY EXAM OF FEMUR 2/>: CPT | Mod: 26,LT

## 2024-11-03 RX ADMIN — METHOCARBAMOL 500 MILLIGRAM(S): 500 TABLET ORAL at 12:37

## 2024-11-03 RX ADMIN — LIDOCAINE HYDROCHLORIDE 1 PATCH: 40 SOLUTION TOPICAL at 18:19

## 2024-11-03 RX ADMIN — HEPARIN SODIUM 5000 UNIT(S): 10000 INJECTION INTRAVENOUS; SUBCUTANEOUS at 21:05

## 2024-11-03 RX ADMIN — PANTOPRAZOLE SODIUM 40 MILLIGRAM(S): 40 TABLET, DELAYED RELEASE ORAL at 05:12

## 2024-11-03 RX ADMIN — METHOCARBAMOL 500 MILLIGRAM(S): 500 TABLET ORAL at 05:12

## 2024-11-03 RX ADMIN — METHOCARBAMOL 500 MILLIGRAM(S): 500 TABLET ORAL at 21:05

## 2024-11-03 RX ADMIN — LIDOCAINE HYDROCHLORIDE 1 PATCH: 40 SOLUTION TOPICAL at 12:37

## 2024-11-03 RX ADMIN — HEPARIN SODIUM 5000 UNIT(S): 10000 INJECTION INTRAVENOUS; SUBCUTANEOUS at 05:12

## 2024-11-03 RX ADMIN — LIDOCAINE HYDROCHLORIDE 1 PATCH: 40 SOLUTION TOPICAL at 22:31

## 2024-11-03 NOTE — PROGRESS NOTE ADULT - ASSESSMENT
63 year old female with PMH of COPD and asthma  (not on home O2), active smoker, hx multiple falls  presents to ED c/o weakness and difficulty ambulating since last week.     # Ambulatory dysfunction s/p mechanical fall  #LLE pain  # Bilateral Rib fractures  #Compression deformity of the superior endplate of L2 of indeterminate age  - PT consult-will need STR  - neurology consult appreciated-Patient can follow up outpatient with neurologist, Dr. Greene, for previously ordered EMG and ALIA scan.  - pain control  -MRI brain:  IMPRESSION:  No evidence for acute intracranial pathology.  Nonspecific scattered white matter signal abnormalities statistically   reflecting moderate chronic microvascular type changes though other   etiologies including demyelination, sequela migraine headaches or chronic   infection/inflammation could be considered.   MRI C spine:  Limited and incomplete examination due to patient early termination and   motion.  No evidence for abnormal spinal cord signal although the spinal cord is   suboptimally visualized on sagittal imaging.  Multilevel degenerative changes contributing to moderate to severe   bilateral foraminal narrowing, suboptimally evaluated.  -Neuro consult appreciated, MR Brain/C-spine reviewed. Patient can follow up outpatient with neurologist, Dr. Greene, for previously ordered EMG and ALIA scan.  -Adjust analgesia, added Robaxin  -Will get XR Left hip, femur, knee, tib/fib  -Will get vasc duplex to r/o DVT    # Polycythemia  - Hg/hct 16.9/ 48 (was 12.3/38 in 4/24), continue to monitor    # Right kidney 1.9 cm angiomyolipoma  - noted on CT  - outpatient follow up with urology    # COPD/Asthma  - not on home O2  - continue albuterol PRN    # Hx smoking  - refused nicotine patch/gum  - smoking cessation counseling done      DVT ppx: heparin  Gi ppx: ppi  DC to STR

## 2024-11-03 NOTE — PROGRESS NOTE ADULT - SUBJECTIVE AND OBJECTIVE BOX
63 year old female with PMH of COPD and asthma  (not on home O2), active smoker, hx multiple falls  presents to ED c/o weakness and difficulty ambulating since last week.     Today:  Seen at bedside, states she has been having trouble walking with PT because she has severe LLE pain from her hip to her ankle.              REVIEW OF SYSTEMS:  LLE pain      MEDICATIONS  (STANDING):  heparin   Injectable 5000 Unit(s) SubCutaneous every 8 hours  influenza  Vaccine (HIGH DOSE) 0.5 milliLiter(s) IntraMuscular once  lidocaine   4% Patch 1 Patch Transdermal daily  methocarbamol 500 milliGRAM(s) Oral three times a day  nicotine -   7 mG/24Hr(s) Patch 1 Patch Transdermal daily  pantoprazole    Tablet 40 milliGRAM(s) Oral before breakfast    MEDICATIONS  (PRN):  acetaminophen     Tablet .. 650 milliGRAM(s) Oral every 6 hours PRN Temp greater or equal to 38C (100.4F), Mild Pain (1 - 3)  albuterol    90 MICROgram(s) HFA Inhaler 2 Puff(s) Inhalation every 6 hours PRN Shortness of Breath and/or Wheezing  oxycodone    5 mG/acetaminophen 325 mG 1 Tablet(s) Oral every 6 hours PRN Moderate Pain (4 - 6)      Allergies  ampicillin (Unknown)      FAMILY HISTORY:  FH: type 2 diabetes        Vital Signs Last 24 Hrs  T(C): 36.8 (03 Nov 2024 13:12), Max: 37.1 (02 Nov 2024 13:54)  T(F): 98.2 (03 Nov 2024 13:12), Max: 98.7 (02 Nov 2024 13:54)  HR: 78 (03 Nov 2024 13:12) (74 - 87)  BP: 113/76 (03 Nov 2024 13:12) (111/71 - 145/76)  RR: 18 (03 Nov 2024 13:12) (16 - 18)  SpO2: 93% (03 Nov 2024 13:12) (90% - 94%)    Parameters below as of 03 Nov 2024 13:12  Patient On (Oxygen Delivery Method): room air        PHYSICAL EXAM:  GENERAL: NAD, lying in bed comfortably  HEAD:  Atraumatic, Normocephalic  EYES: EOMI, PERRLA, conjunctiva and sclera clear  ENT: Moist mucous membranes  NECK: Supple  CHEST/LUNG: Clear to auscultation bilaterally; No wheezing, or rubs. Unlabored respirations  HEART: Regular rate and rhythm; no rubs, or gallops  ABDOMEN: Bowel sounds present; Soft, Nontender, Nondistended  EXTREMITIES:  No LE edema bilaterally  NERVOUS SYSTEM:  Alert & Oriented X3, speech clear  MSK: no midline spinal tenderness, decreased left leg elevation due to pain  SKIN: No rashes or lesions      LABS:                        17.4   7.01  )-----------( 335      ( 03 Nov 2024 06:13 )             50.3

## 2024-11-04 LAB
ALBUMIN SERPL ELPH-MCNC: 3.5 G/DL — SIGNIFICANT CHANGE UP (ref 3.5–5.2)
ALP SERPL-CCNC: 178 U/L — HIGH (ref 30–115)
ALT FLD-CCNC: 11 U/L — SIGNIFICANT CHANGE UP (ref 0–41)
ANION GAP SERPL CALC-SCNC: 10 MMOL/L — SIGNIFICANT CHANGE UP (ref 7–14)
AST SERPL-CCNC: 17 U/L — SIGNIFICANT CHANGE UP (ref 0–41)
BASOPHILS # BLD AUTO: 0.06 K/UL — SIGNIFICANT CHANGE UP (ref 0–0.2)
BASOPHILS NFR BLD AUTO: 0.7 % — SIGNIFICANT CHANGE UP (ref 0–1)
BILIRUB SERPL-MCNC: 0.3 MG/DL — SIGNIFICANT CHANGE UP (ref 0.2–1.2)
BUN SERPL-MCNC: 15 MG/DL — SIGNIFICANT CHANGE UP (ref 10–20)
CALCIUM SERPL-MCNC: 9.4 MG/DL — SIGNIFICANT CHANGE UP (ref 8.4–10.5)
CHLORIDE SERPL-SCNC: 100 MMOL/L — SIGNIFICANT CHANGE UP (ref 98–110)
CO2 SERPL-SCNC: 29 MMOL/L — SIGNIFICANT CHANGE UP (ref 17–32)
CREAT SERPL-MCNC: 0.7 MG/DL — SIGNIFICANT CHANGE UP (ref 0.7–1.5)
EGFR: 96 ML/MIN/1.73M2 — SIGNIFICANT CHANGE UP
EOSINOPHIL # BLD AUTO: 0.43 K/UL — SIGNIFICANT CHANGE UP (ref 0–0.7)
EOSINOPHIL NFR BLD AUTO: 4.9 % — SIGNIFICANT CHANGE UP (ref 0–8)
GLUCOSE SERPL-MCNC: 79 MG/DL — SIGNIFICANT CHANGE UP (ref 70–99)
HCT VFR BLD CALC: 47.3 % — HIGH (ref 37–47)
HGB BLD-MCNC: 16 G/DL — SIGNIFICANT CHANGE UP (ref 12–16)
IMM GRANULOCYTES NFR BLD AUTO: 0.1 % — SIGNIFICANT CHANGE UP (ref 0.1–0.3)
LYMPHOCYTES # BLD AUTO: 3.16 K/UL — SIGNIFICANT CHANGE UP (ref 1.2–3.4)
LYMPHOCYTES # BLD AUTO: 35.8 % — SIGNIFICANT CHANGE UP (ref 20.5–51.1)
MCHC RBC-ENTMCNC: 33.8 G/DL — SIGNIFICANT CHANGE UP (ref 32–37)
MCHC RBC-ENTMCNC: 35.6 PG — HIGH (ref 27–31)
MCV RBC AUTO: 105.3 FL — HIGH (ref 81–99)
MONOCYTES # BLD AUTO: 1.02 K/UL — HIGH (ref 0.1–0.6)
MONOCYTES NFR BLD AUTO: 11.6 % — HIGH (ref 1.7–9.3)
NEUTROPHILS # BLD AUTO: 4.15 K/UL — SIGNIFICANT CHANGE UP (ref 1.4–6.5)
NEUTROPHILS NFR BLD AUTO: 46.9 % — SIGNIFICANT CHANGE UP (ref 42.2–75.2)
NRBC # BLD: 0 /100 WBCS — SIGNIFICANT CHANGE UP (ref 0–0)
PLATELET # BLD AUTO: 340 K/UL — SIGNIFICANT CHANGE UP (ref 130–400)
PMV BLD: 9.1 FL — SIGNIFICANT CHANGE UP (ref 7.4–10.4)
POTASSIUM SERPL-MCNC: 4.7 MMOL/L — SIGNIFICANT CHANGE UP (ref 3.5–5)
POTASSIUM SERPL-SCNC: 4.7 MMOL/L — SIGNIFICANT CHANGE UP (ref 3.5–5)
PROT SERPL-MCNC: 6.2 G/DL — SIGNIFICANT CHANGE UP (ref 6–8)
RBC # BLD: 4.49 M/UL — SIGNIFICANT CHANGE UP (ref 4.2–5.4)
RBC # FLD: 14.3 % — SIGNIFICANT CHANGE UP (ref 11.5–14.5)
SODIUM SERPL-SCNC: 139 MMOL/L — SIGNIFICANT CHANGE UP (ref 135–146)
WBC # BLD: 8.83 K/UL — SIGNIFICANT CHANGE UP (ref 4.8–10.8)
WBC # FLD AUTO: 8.83 K/UL — SIGNIFICANT CHANGE UP (ref 4.8–10.8)

## 2024-11-04 PROCEDURE — 72131 CT LUMBAR SPINE W/O DYE: CPT | Mod: 26

## 2024-11-04 PROCEDURE — 99232 SBSQ HOSP IP/OBS MODERATE 35: CPT

## 2024-11-04 PROCEDURE — 72148 MRI LUMBAR SPINE W/O DYE: CPT | Mod: 26

## 2024-11-04 RX ORDER — ALPRAZOLAM 0.25 MG
0.5 TABLET ORAL THREE TIMES A DAY
Refills: 0 | Status: DISCONTINUED | OUTPATIENT
Start: 2024-11-04 | End: 2024-11-10

## 2024-11-04 RX ADMIN — HEPARIN SODIUM 5000 UNIT(S): 10000 INJECTION INTRAVENOUS; SUBCUTANEOUS at 21:18

## 2024-11-04 RX ADMIN — METHOCARBAMOL 500 MILLIGRAM(S): 500 TABLET ORAL at 05:02

## 2024-11-04 RX ADMIN — METHOCARBAMOL 500 MILLIGRAM(S): 500 TABLET ORAL at 11:42

## 2024-11-04 RX ADMIN — LIDOCAINE HYDROCHLORIDE 1 PATCH: 40 SOLUTION TOPICAL at 11:42

## 2024-11-04 RX ADMIN — HEPARIN SODIUM 5000 UNIT(S): 10000 INJECTION INTRAVENOUS; SUBCUTANEOUS at 05:02

## 2024-11-04 RX ADMIN — METHOCARBAMOL 500 MILLIGRAM(S): 500 TABLET ORAL at 21:18

## 2024-11-04 RX ADMIN — Medication 0.5 MILLIGRAM(S): at 18:56

## 2024-11-04 RX ADMIN — PANTOPRAZOLE SODIUM 40 MILLIGRAM(S): 40 TABLET, DELAYED RELEASE ORAL at 05:02

## 2024-11-04 RX ADMIN — LIDOCAINE HYDROCHLORIDE 1 PATCH: 40 SOLUTION TOPICAL at 22:32

## 2024-11-04 RX ADMIN — LIDOCAINE HYDROCHLORIDE 1 PATCH: 40 SOLUTION TOPICAL at 16:57

## 2024-11-04 NOTE — PROGRESS NOTE ADULT - SUBJECTIVE AND OBJECTIVE BOX
63 year old female with PMH of COPD and asthma  (not on home O2), active smoker, hx multiple falls  presents to ED c/o weakness and difficulty ambulating since last week.     Today:  Seen at bedside, still complaints of a great deal of pain with ambulation.              REVIEW OF SYSTEMS:  LLE and Back pain    MEDICATIONS  (STANDING):  heparin   Injectable 5000 Unit(s) SubCutaneous every 8 hours  influenza  Vaccine (HIGH DOSE) 0.5 milliLiter(s) IntraMuscular once  lidocaine   4% Patch 1 Patch Transdermal daily  methocarbamol 500 milliGRAM(s) Oral three times a day  nicotine -   7 mG/24Hr(s) Patch 1 Patch Transdermal daily  pantoprazole    Tablet 40 milliGRAM(s) Oral before breakfast  predniSONE   Tablet 60 milliGRAM(s) Oral daily    MEDICATIONS  (PRN):  acetaminophen     Tablet .. 650 milliGRAM(s) Oral every 6 hours PRN Temp greater or equal to 38C (100.4F), Mild Pain (1 - 3)  albuterol    90 MICROgram(s) HFA Inhaler 2 Puff(s) Inhalation every 6 hours PRN Shortness of Breath and/or Wheezing  oxycodone    5 mG/acetaminophen 325 mG 1 Tablet(s) Oral every 6 hours PRN Moderate Pain (4 - 6)      Allergies  ampicillin (Unknown)        FAMILY HISTORY:  FH: type 2 diabetes        Vital Signs Last 24 Hrs  T(C): 36.7 (04 Nov 2024 13:29), Max: 36.7 (03 Nov 2024 20:57)  T(F): 98 (04 Nov 2024 13:29), Max: 98.1 (03 Nov 2024 20:57)  HR: 102 (04 Nov 2024 13:29) (87 - 102)  BP: 130/80 (04 Nov 2024 13:29) (119/68 - 130/80)  RR: 18 (04 Nov 2024 13:29) (16 - 18)  SpO2: 93% (04 Nov 2024 13:29) (90% - 94%)    Parameters below as of 04 Nov 2024 13:29  Patient On (Oxygen Delivery Method): room air        PHYSICAL EXAM:  GENERAL: NAD, lying in bed comfortably  HEAD:  Atraumatic, Normocephalic  EYES: EOMI, PERRLA, conjunctiva and sclera clear  ENT: Moist mucous membranes  NECK: Supple  CHEST/LUNG: Clear to auscultation bilaterally; No wheezing, or rubs. Unlabored respirations  HEART: Regular rate and rhythm; no rubs, or gallops  ABDOMEN: Bowel sounds present; Soft, Nontender, Nondistended  EXTREMITIES:  No LE edema bilaterally  NERVOUS SYSTEM:  Alert & Oriented X3, speech clear  MSK: no midline spinal tenderness, decreased left leg elevation due to pain  SKIN: No rashes or lesions      LABS:                        16.0   8.83  )-----------( 340      ( 04 Nov 2024 05:44 )             47.3     11-04    139  |  100  |  15  ----------------------------<  79  4.7   |  29  |  0.7    Ca    9.4      04 Nov 2024 05:44    TPro  6.2  /  Alb  3.5  /  TBili  0.3  /  DBili  x   /  AST  17  /  ALT  11  /  AlkPhos  178[H]  11-04      Urinalysis Basic - ( 04 Nov 2024 05:44 )    Color: x / Appearance: x / SG: x / pH: x  Gluc: 79 mg/dL / Ketone: x  / Bili: x / Urobili: x   Blood: x / Protein: x / Nitrite: x   Leuk Esterase: x / RBC: x / WBC x   Sq Epi: x / Non Sq Epi: x / Bacteria: x

## 2024-11-04 NOTE — PROGRESS NOTE ADULT - ASSESSMENT
63 year old female with PMH of COPD and asthma  (not on home O2), active smoker, hx multiple falls  presents to ED c/o weakness and difficulty ambulating since last week.     # Ambulatory dysfunction s/p mechanical fall  #S2 fracture  # Bilateral Rib fractures  #Compression deformity of the superior endplate of L2 of indeterminate age  - PT consult-will need STR  - neurology consult appreciated-Patient can follow up outpatient with neurologist, Dr. Greene, for previously ordered EMG and ALIA scan.  - pain control  -Neuro consult appreciated, MR Brain/C-spine reviewed. Patient can follow up outpatient with neurologist, Dr. Greene, for previously ordered EMG and ALIA scan.  -Adjust analgesia, added Robaxin  -MR LS:  IMPRESSION:  Nondisplaced acute fracture in the S2 sacral body with extensive sacral   edema, new since the prior lumbar MRI from 4/18/2024. Adjacent sacral   epidural fluid signal results in moderate narrowing of the thecal sac.   The finding could represent edema and/or epidural hematoma.  Unchanged multilevel lumbar spondylosis and spondylolisthesis, most   severe at L5-S1 with grade 2 anterolisthesis and severe bilateral   foraminal stenosis with impingement of the exiting bilateral L5 nerve   roots.  Unchanged L4-5 moderate-severe left/moderate right foraminal stenosis..  -Discussed case with neurosurgery, will get standing LS XR and CT LS without contrast    # Polycythemia  - Hg/hct 16.9/ 48 (was 12.3/38 in 4/24), continue to monitor    # Right kidney 1.9 cm angiomyolipoma  - noted on CT  - outpatient follow up with urology    # COPD/Asthma  - not on home O2  - continue albuterol PRN    # Hx smoking  - refused nicotine patch/gum  - smoking cessation counseling done      DVT ppx: heparin  Gi ppx: ppi  DC to STR

## 2024-11-05 LAB
ALBUMIN SERPL ELPH-MCNC: 3.4 G/DL — LOW (ref 3.5–5.2)
ALP SERPL-CCNC: 187 U/L — HIGH (ref 30–115)
ALT FLD-CCNC: 11 U/L — SIGNIFICANT CHANGE UP (ref 0–41)
ANION GAP SERPL CALC-SCNC: 13 MMOL/L — SIGNIFICANT CHANGE UP (ref 7–14)
AST SERPL-CCNC: 16 U/L — SIGNIFICANT CHANGE UP (ref 0–41)
BASOPHILS # BLD AUTO: 0.06 K/UL — SIGNIFICANT CHANGE UP (ref 0–0.2)
BASOPHILS NFR BLD AUTO: 0.8 % — SIGNIFICANT CHANGE UP (ref 0–1)
BILIRUB SERPL-MCNC: 0.4 MG/DL — SIGNIFICANT CHANGE UP (ref 0.2–1.2)
BUN SERPL-MCNC: 12 MG/DL — SIGNIFICANT CHANGE UP (ref 10–20)
CALCIUM SERPL-MCNC: 9.5 MG/DL — SIGNIFICANT CHANGE UP (ref 8.4–10.5)
CHLORIDE SERPL-SCNC: 101 MMOL/L — SIGNIFICANT CHANGE UP (ref 98–110)
CO2 SERPL-SCNC: 24 MMOL/L — SIGNIFICANT CHANGE UP (ref 17–32)
CREAT SERPL-MCNC: 0.6 MG/DL — LOW (ref 0.7–1.5)
EGFR: 100 ML/MIN/1.73M2 — SIGNIFICANT CHANGE UP
EOSINOPHIL # BLD AUTO: 0.32 K/UL — SIGNIFICANT CHANGE UP (ref 0–0.7)
EOSINOPHIL NFR BLD AUTO: 4.2 % — SIGNIFICANT CHANGE UP (ref 0–8)
GLUCOSE SERPL-MCNC: 88 MG/DL — SIGNIFICANT CHANGE UP (ref 70–99)
HCT VFR BLD CALC: 48.6 % — HIGH (ref 37–47)
HGB BLD-MCNC: 16.7 G/DL — HIGH (ref 12–16)
IMM GRANULOCYTES NFR BLD AUTO: 0.5 % — HIGH (ref 0.1–0.3)
LYMPHOCYTES # BLD AUTO: 1.14 K/UL — LOW (ref 1.2–3.4)
LYMPHOCYTES # BLD AUTO: 14.8 % — LOW (ref 20.5–51.1)
MCHC RBC-ENTMCNC: 34.4 G/DL — SIGNIFICANT CHANGE UP (ref 32–37)
MCHC RBC-ENTMCNC: 35.5 PG — HIGH (ref 27–31)
MCV RBC AUTO: 103.4 FL — HIGH (ref 81–99)
MONOCYTES # BLD AUTO: 0.29 K/UL — SIGNIFICANT CHANGE UP (ref 0.1–0.6)
MONOCYTES NFR BLD AUTO: 3.8 % — SIGNIFICANT CHANGE UP (ref 1.7–9.3)
NEUTROPHILS # BLD AUTO: 5.85 K/UL — SIGNIFICANT CHANGE UP (ref 1.4–6.5)
NEUTROPHILS NFR BLD AUTO: 75.9 % — HIGH (ref 42.2–75.2)
NRBC # BLD: 0 /100 WBCS — SIGNIFICANT CHANGE UP (ref 0–0)
PLATELET # BLD AUTO: 344 K/UL — SIGNIFICANT CHANGE UP (ref 130–400)
PMV BLD: 8.5 FL — SIGNIFICANT CHANGE UP (ref 7.4–10.4)
POTASSIUM SERPL-MCNC: 4.3 MMOL/L — SIGNIFICANT CHANGE UP (ref 3.5–5)
POTASSIUM SERPL-SCNC: 4.3 MMOL/L — SIGNIFICANT CHANGE UP (ref 3.5–5)
PROT SERPL-MCNC: 6.3 G/DL — SIGNIFICANT CHANGE UP (ref 6–8)
RBC # BLD: 4.7 M/UL — SIGNIFICANT CHANGE UP (ref 4.2–5.4)
RBC # FLD: 14.6 % — HIGH (ref 11.5–14.5)
SODIUM SERPL-SCNC: 138 MMOL/L — SIGNIFICANT CHANGE UP (ref 135–146)
WBC # BLD: 7.7 K/UL — SIGNIFICANT CHANGE UP (ref 4.8–10.8)
WBC # FLD AUTO: 7.7 K/UL — SIGNIFICANT CHANGE UP (ref 4.8–10.8)

## 2024-11-05 PROCEDURE — 99232 SBSQ HOSP IP/OBS MODERATE 35: CPT

## 2024-11-05 RX ORDER — SENNA 187 MG
2 TABLET ORAL AT BEDTIME
Refills: 0 | Status: DISCONTINUED | OUTPATIENT
Start: 2024-11-05 | End: 2024-11-10

## 2024-11-05 RX ORDER — POLYETHYLENE GLYCOL 3350 17 G/17G
17 POWDER, FOR SOLUTION ORAL DAILY
Refills: 0 | Status: DISCONTINUED | OUTPATIENT
Start: 2024-11-05 | End: 2024-11-10

## 2024-11-05 RX ADMIN — Medication 0.5 MILLIGRAM(S): at 13:48

## 2024-11-05 RX ADMIN — HEPARIN SODIUM 5000 UNIT(S): 10000 INJECTION INTRAVENOUS; SUBCUTANEOUS at 05:00

## 2024-11-05 RX ADMIN — METHOCARBAMOL 500 MILLIGRAM(S): 500 TABLET ORAL at 13:48

## 2024-11-05 RX ADMIN — METHOCARBAMOL 500 MILLIGRAM(S): 500 TABLET ORAL at 21:10

## 2024-11-05 RX ADMIN — Medication 0.5 MILLIGRAM(S): at 05:00

## 2024-11-05 RX ADMIN — HEPARIN SODIUM 5000 UNIT(S): 10000 INJECTION INTRAVENOUS; SUBCUTANEOUS at 13:50

## 2024-11-05 RX ADMIN — Medication 0.5 MILLIGRAM(S): at 21:09

## 2024-11-05 RX ADMIN — METHOCARBAMOL 500 MILLIGRAM(S): 500 TABLET ORAL at 05:00

## 2024-11-05 RX ADMIN — LIDOCAINE HYDROCHLORIDE 1 PATCH: 40 SOLUTION TOPICAL at 11:02

## 2024-11-05 RX ADMIN — POLYETHYLENE GLYCOL 3350 17 GRAM(S): 17 POWDER, FOR SOLUTION ORAL at 11:02

## 2024-11-05 RX ADMIN — Medication 2 TABLET(S): at 21:09

## 2024-11-05 RX ADMIN — PANTOPRAZOLE SODIUM 40 MILLIGRAM(S): 40 TABLET, DELAYED RELEASE ORAL at 05:00

## 2024-11-05 RX ADMIN — Medication 60 MILLIGRAM(S): at 05:00

## 2024-11-05 NOTE — PROGRESS NOTE ADULT - SUBJECTIVE AND OBJECTIVE BOX
63 year old female with PMH of COPD and asthma  (not on home O2), active smoker, hx multiple falls  presents to ED c/o weakness and difficulty ambulating since last week.     Today:  Seen at bedside, patient is amenable to procedure under neurosurgery if necessary.            REVIEW OF SYSTEMS:  No new complaints      MEDICATIONS  (STANDING):  ALPRAZolam 0.5 milliGRAM(s) Oral three times a day  heparin   Injectable 5000 Unit(s) SubCutaneous every 8 hours  influenza  Vaccine (HIGH DOSE) 0.5 milliLiter(s) IntraMuscular once  lidocaine   4% Patch 1 Patch Transdermal daily  methocarbamol 500 milliGRAM(s) Oral three times a day  nicotine -   7 mG/24Hr(s) Patch 1 Patch Transdermal daily  pantoprazole    Tablet 40 milliGRAM(s) Oral before breakfast  polyethylene glycol 3350 17 Gram(s) Oral daily  predniSONE   Tablet 60 milliGRAM(s) Oral daily  senna 2 Tablet(s) Oral at bedtime    MEDICATIONS  (PRN):  acetaminophen     Tablet .. 650 milliGRAM(s) Oral every 6 hours PRN Temp greater or equal to 38C (100.4F), Mild Pain (1 - 3)  albuterol    90 MICROgram(s) HFA Inhaler 2 Puff(s) Inhalation every 6 hours PRN Shortness of Breath and/or Wheezing  oxycodone    5 mG/acetaminophen 325 mG 1 Tablet(s) Oral every 6 hours PRN Moderate Pain (4 - 6)      Allergies  ampicillin (Unknown)        FAMILY HISTORY:  FH: type 2 diabetes        Vital Signs Last 24 Hrs  T(C): 36.6 (05 Nov 2024 13:44), Max: 36.7 (05 Nov 2024 04:35)  T(F): 97.9 (05 Nov 2024 13:44), Max: 98 (05 Nov 2024 04:35)  HR: 103 (05 Nov 2024 13:44) (80 - 103)  BP: 145/82 (05 Nov 2024 13:44) (115/63 - 145/82)  RR: 18 (05 Nov 2024 13:44) (18 - 18)  SpO2: 98% (05 Nov 2024 13:44) (93% - 98%)    Parameters below as of 05 Nov 2024 04:35  Patient On (Oxygen Delivery Method): room air        PHYSICAL EXAM:  GENERAL: NAD, lying in bed comfortably  HEAD:  Atraumatic, Normocephalic  EYES: EOMI, PERRLA, conjunctiva and sclera clear  ENT: Moist mucous membranes  NECK: Supple  CHEST/LUNG: Clear to auscultation bilaterally; No wheezing, or rubs. Unlabored respirations  HEART: Regular rate and rhythm; no rubs, or gallops  ABDOMEN: Bowel sounds present; Soft, Nontender, Nondistended  EXTREMITIES:  No LE edema bilaterally  NERVOUS SYSTEM:  Alert & Oriented X3, speech clear  MSK: no midline spinal tenderness, decreased left leg elevation due to pain  SKIN: No rashes or lesions      LABS:                        16.7   7.70  )-----------( 344      ( 05 Nov 2024 07:00 )             48.6     11-05    138  |  101  |  12  ----------------------------<  88  4.3   |  24  |  0.6[L]    Ca    9.5      05 Nov 2024 07:00    TPro  6.3  /  Alb  3.4[L]  /  TBili  0.4  /  DBili  x   /  AST  16  /  ALT  11  /  AlkPhos  187[H]  11-05      Urinalysis Basic - ( 05 Nov 2024 07:00 )    Color: x / Appearance: x / SG: x / pH: x  Gluc: 88 mg/dL / Ketone: x  / Bili: x / Urobili: x   Blood: x / Protein: x / Nitrite: x   Leuk Esterase: x / RBC: x / WBC x   Sq Epi: x / Non Sq Epi: x / Bacteria: x

## 2024-11-05 NOTE — PROGRESS NOTE ADULT - ASSESSMENT
63 year old female with PMH of COPD and asthma  (not on home O2), active smoker, hx multiple falls  presents to ED c/o weakness and difficulty ambulating since last week.     # Ambulatory dysfunction s/p mechanical fall  #S2 fracture  # Bilateral Rib fractures  #Compression deformity of the superior endplate of L2 of indeterminate age  - PT consult-will need STR  - neurology consult appreciated-Patient can follow up outpatient with neurologist, Dr. Greene, for previously ordered EMG and ALIA scan.  - pain control  -Neuro consult appreciated, MR Brain/C-spine reviewed. Patient can follow up outpatient with neurologist, Dr. Greene, for previously ordered EMG and ALIA scan.  -Adjust analgesia, added Robaxin, steroids  -MR LS:  IMPRESSION:  Nondisplaced acute fracture in the S2 sacral body with extensive sacral   edema, new since the prior lumbar MRI from 4/18/2024. Adjacent sacral   epidural fluid signal results in moderate narrowing of the thecal sac.   The finding could represent edema and/or epidural hematoma.  Unchanged multilevel lumbar spondylosis and spondylolisthesis, most   severe at L5-S1 with grade 2 anterolisthesis and severe bilateral   foraminal stenosis with impingement of the exiting bilateral L5 nerve   roots.  Unchanged L4-5 moderate-severe left/moderate right foraminal stenosis..  -Discussed case with neurosurgery, patient could not tolerate standing XR LS due to pain, CT LS without contrast done  -Awaiting decision from neurosurgery on plan    # Polycythemia  - Hg/hct 16.9/ 48 (was 12.3/38 in 4/24), continue to monitor    # Right kidney 1.9 cm angiomyolipoma  - noted on CT  - outpatient follow up with urology    # COPD/Asthma  - not on home O2  - continue albuterol PRN    #Anxiety:  -started Xanax    # Hx smoking  - refused nicotine patch/gum  - smoking cessation counseling done      DVT ppx: heparin  Gi ppx: ppi  DC to STR when ready    -Awaiting decision from neurosurgery on plan.  Possible OR for fixation/cementing of S2 fracture.

## 2024-11-06 LAB
ALBUMIN SERPL ELPH-MCNC: 3.4 G/DL — LOW (ref 3.5–5.2)
ALP SERPL-CCNC: 196 U/L — HIGH (ref 30–115)
ALT FLD-CCNC: 17 U/L — SIGNIFICANT CHANGE UP (ref 0–41)
ANION GAP SERPL CALC-SCNC: 10 MMOL/L — SIGNIFICANT CHANGE UP (ref 7–14)
AST SERPL-CCNC: 28 U/L — SIGNIFICANT CHANGE UP (ref 0–41)
BASOPHILS # BLD AUTO: 0.06 K/UL — SIGNIFICANT CHANGE UP (ref 0–0.2)
BASOPHILS NFR BLD AUTO: 0.6 % — SIGNIFICANT CHANGE UP (ref 0–1)
BILIRUB SERPL-MCNC: 0.2 MG/DL — SIGNIFICANT CHANGE UP (ref 0.2–1.2)
BUN SERPL-MCNC: 15 MG/DL — SIGNIFICANT CHANGE UP (ref 10–20)
CALCIUM SERPL-MCNC: 9.5 MG/DL — SIGNIFICANT CHANGE UP (ref 8.4–10.5)
CHLORIDE SERPL-SCNC: 102 MMOL/L — SIGNIFICANT CHANGE UP (ref 98–110)
CO2 SERPL-SCNC: 26 MMOL/L — SIGNIFICANT CHANGE UP (ref 17–32)
CREAT SERPL-MCNC: 0.6 MG/DL — LOW (ref 0.7–1.5)
EGFR: 100 ML/MIN/1.73M2 — SIGNIFICANT CHANGE UP
EOSINOPHIL # BLD AUTO: 0.23 K/UL — SIGNIFICANT CHANGE UP (ref 0–0.7)
EOSINOPHIL NFR BLD AUTO: 2.1 % — SIGNIFICANT CHANGE UP (ref 0–8)
GLUCOSE SERPL-MCNC: 81 MG/DL — SIGNIFICANT CHANGE UP (ref 70–99)
HCT VFR BLD CALC: 47.1 % — HIGH (ref 37–47)
HGB BLD-MCNC: 16 G/DL — SIGNIFICANT CHANGE UP (ref 12–16)
IMM GRANULOCYTES NFR BLD AUTO: 0.5 % — HIGH (ref 0.1–0.3)
LYMPHOCYTES # BLD AUTO: 3.33 K/UL — SIGNIFICANT CHANGE UP (ref 1.2–3.4)
LYMPHOCYTES # BLD AUTO: 30.7 % — SIGNIFICANT CHANGE UP (ref 20.5–51.1)
MCHC RBC-ENTMCNC: 34 G/DL — SIGNIFICANT CHANGE UP (ref 32–37)
MCHC RBC-ENTMCNC: 35.9 PG — HIGH (ref 27–31)
MCV RBC AUTO: 105.6 FL — HIGH (ref 81–99)
MONOCYTES # BLD AUTO: 0.93 K/UL — HIGH (ref 0.1–0.6)
MONOCYTES NFR BLD AUTO: 8.6 % — SIGNIFICANT CHANGE UP (ref 1.7–9.3)
NEUTROPHILS # BLD AUTO: 6.25 K/UL — SIGNIFICANT CHANGE UP (ref 1.4–6.5)
NEUTROPHILS NFR BLD AUTO: 57.5 % — SIGNIFICANT CHANGE UP (ref 42.2–75.2)
NRBC # BLD: 0 /100 WBCS — SIGNIFICANT CHANGE UP (ref 0–0)
PLATELET # BLD AUTO: 368 K/UL — SIGNIFICANT CHANGE UP (ref 130–400)
PMV BLD: 8.9 FL — SIGNIFICANT CHANGE UP (ref 7.4–10.4)
POTASSIUM SERPL-MCNC: 4.6 MMOL/L — SIGNIFICANT CHANGE UP (ref 3.5–5)
POTASSIUM SERPL-SCNC: 4.6 MMOL/L — SIGNIFICANT CHANGE UP (ref 3.5–5)
PROT SERPL-MCNC: 6.1 G/DL — SIGNIFICANT CHANGE UP (ref 6–8)
RBC # BLD: 4.46 M/UL — SIGNIFICANT CHANGE UP (ref 4.2–5.4)
RBC # FLD: 14.5 % — SIGNIFICANT CHANGE UP (ref 11.5–14.5)
SODIUM SERPL-SCNC: 138 MMOL/L — SIGNIFICANT CHANGE UP (ref 135–146)
WBC # BLD: 10.85 K/UL — HIGH (ref 4.8–10.8)
WBC # FLD AUTO: 10.85 K/UL — HIGH (ref 4.8–10.8)

## 2024-11-06 PROCEDURE — 99406 BEHAV CHNG SMOKING 3-10 MIN: CPT

## 2024-11-06 PROCEDURE — 99233 SBSQ HOSP IP/OBS HIGH 50: CPT

## 2024-11-06 RX ORDER — ACETAMINOPHEN 500 MG
1000 TABLET ORAL EVERY 8 HOURS
Refills: 0 | Status: DISCONTINUED | OUTPATIENT
Start: 2024-11-06 | End: 2024-11-10

## 2024-11-06 RX ORDER — OXYCODONE HYDROCHLORIDE 30 MG/1
5 TABLET ORAL EVERY 4 HOURS
Refills: 0 | Status: DISCONTINUED | OUTPATIENT
Start: 2024-11-06 | End: 2024-11-10

## 2024-11-06 RX ADMIN — OXYCODONE HYDROCHLORIDE 5 MILLIGRAM(S): 30 TABLET ORAL at 15:19

## 2024-11-06 RX ADMIN — Medication 0.5 MILLIGRAM(S): at 05:12

## 2024-11-06 RX ADMIN — METHOCARBAMOL 500 MILLIGRAM(S): 500 TABLET ORAL at 05:12

## 2024-11-06 RX ADMIN — Medication 1000 MILLIGRAM(S): at 23:05

## 2024-11-06 RX ADMIN — Medication 2 PUFF(S): at 07:39

## 2024-11-06 RX ADMIN — OXYCODONE HYDROCHLORIDE 5 MILLIGRAM(S): 30 TABLET ORAL at 14:36

## 2024-11-06 RX ADMIN — METHOCARBAMOL 500 MILLIGRAM(S): 500 TABLET ORAL at 14:31

## 2024-11-06 RX ADMIN — Medication 0.5 MILLIGRAM(S): at 22:52

## 2024-11-06 RX ADMIN — Medication 1000 MILLIGRAM(S): at 22:52

## 2024-11-06 RX ADMIN — METHOCARBAMOL 500 MILLIGRAM(S): 500 TABLET ORAL at 22:52

## 2024-11-06 RX ADMIN — Medication 0.5 MILLIGRAM(S): at 14:31

## 2024-11-06 RX ADMIN — Medication 60 MILLIGRAM(S): at 05:12

## 2024-11-06 RX ADMIN — OXYCODONE HYDROCHLORIDE 5 MILLIGRAM(S): 30 TABLET ORAL at 22:52

## 2024-11-06 RX ADMIN — LIDOCAINE HYDROCHLORIDE 1 PATCH: 40 SOLUTION TOPICAL at 11:30

## 2024-11-06 RX ADMIN — PANTOPRAZOLE SODIUM 40 MILLIGRAM(S): 40 TABLET, DELAYED RELEASE ORAL at 05:12

## 2024-11-06 RX ADMIN — LIDOCAINE HYDROCHLORIDE 1 PATCH: 40 SOLUTION TOPICAL at 18:19

## 2024-11-06 RX ADMIN — LIDOCAINE HYDROCHLORIDE 1 PATCH: 40 SOLUTION TOPICAL at 20:57

## 2024-11-06 RX ADMIN — Medication 2 TABLET(S): at 22:52

## 2024-11-06 NOTE — PROGRESS NOTE ADULT - SUBJECTIVE AND OBJECTIVE BOX
SUBJECTIVE:    Patient is a 63y old Female who presents with a chief complaint of   Currently admitted to medicine with the primary diagnosis of Ambulatory dysfunction       Today is hospital day 7d. This morning she is resting comfortably in bed and reports no new issues or overnight events.     ROS:   CONSTITUTIONAL: No weakness, fevers or chills   EYES/ENT: No visual changes; No vertigo or throat pain   NECK: No pain or stiffness   RESPIRATORY: No cough, wheezing, hemoptysis; No shortness of breath   CARDIOVASCULAR: No chest pain or palpitations   GASTROINTESTINAL: No abdominal or epigastric pain. No nausea, vomiting, or hematemesis; No diarrhea or constipation. No melena or hematochezia.  GENITOURINARY: No dysuria, frequency or hematuria  NEUROLOGICAL: No numbness or weakness ; denies loss of bladder or bowel       PAST MEDICAL & SURGICAL HISTORY  Asthma    Chronic obstructive pulmonary disease      SOCIAL HISTORY:    ALLERGIES:  ampicillin (Unknown)    MEDICATIONS:  STANDING MEDICATIONS  acetaminophen     Tablet .. 1000 milliGRAM(s) Oral every 8 hours  ALPRAZolam 0.5 milliGRAM(s) Oral three times a day  heparin   Injectable 5000 Unit(s) SubCutaneous every 8 hours  influenza  Vaccine (HIGH DOSE) 0.5 milliLiter(s) IntraMuscular once  lidocaine   4% Patch 1 Patch Transdermal daily  methocarbamol 500 milliGRAM(s) Oral three times a day  nicotine -   7 mG/24Hr(s) Patch 1 Patch Transdermal daily  pantoprazole    Tablet 40 milliGRAM(s) Oral before breakfast  polyethylene glycol 3350 17 Gram(s) Oral daily  predniSONE   Tablet 60 milliGRAM(s) Oral daily  senna 2 Tablet(s) Oral at bedtime    PRN MEDICATIONS  albuterol    90 MICROgram(s) HFA Inhaler 2 Puff(s) Inhalation every 6 hours PRN  oxyCODONE    IR 5 milliGRAM(s) Oral every 4 hours PRN    VITALS:   T(F): 97.6  HR: 76  BP: 127/61  RR: 18  SpO2: 94%    LABS:                        16.0   10.85 )-----------( 368      ( 06 Nov 2024 06:42 )             47.1     11-06    138  |  102  |  15  ----------------------------<  81  4.6   |  26  |  0.6[L]    Ca    9.5      06 Nov 2024 06:42    TPro  6.1  /  Alb  3.4[L]  /  TBili  0.2  /  DBili  x   /  AST  28  /  ALT  17  /  AlkPhos  196[H]  11-06      Urinalysis Basic - ( 06 Nov 2024 06:42 )    Color: x / Appearance: x / SG: x / pH: x  Gluc: 81 mg/dL / Ketone: x  / Bili: x / Urobili: x   Blood: x / Protein: x / Nitrite: x   Leuk Esterase: x / RBC: x / WBC x   Sq Epi: x / Non Sq Epi: x / Bacteria: x      RADIOLOGY:    PHYSICAL EXAM:  GEN: No acute distress  HEENT: normocephalic, atraumatic, aniceteric  LUNGS: Clear to auscultation bilaterally, no rales/wheezing/ rhonchi  HEART: S1/S2 present. RRR, no murmurs  ABD: Soft, non-tender, non-distended. Bowel sounds present  EXT: b/l LE   NEURO: AAOX3, normal affect , no neurological deficits       ASSESSMENT AND PLAN:    63 year old female with PMH of COPD and asthma  (not on home O2), active smoker, hx multiple falls  presents to ED c/o weakness and difficulty ambulating since last week.     # S2 fracture , non displaced w/ edema   # Bilateral Rib fractures  # Compression deformity of the superior endplate of L2 of indeterminate age  - PT consult-will need STR  - neurology consult appreciated-Patient can follow up outpatient with neurologist, Dr. Greene, for previously ordered EMG and ALIA scan.  -Neuro consult appreciated, MR Brain/C-spine reviewed. Patient can follow up outpatient with neurologist, Dr. Greene, for previously ordered EMG and ALIA scan.  -Adjust analgesia, added Robaxin, steroids  -MR LS:  IMPRESSION:  Nondisplaced acute fracture in the S2 sacral body with extensive sacral   edema, new since the prior lumbar MRI from 4/18/2024. Adjacent sacral   epidural fluid signal results in moderate narrowing of the thecal sac.   The finding could represent edema and/or epidural hematoma.  Unchanged multilevel lumbar spondylosis and spondylolisthesis, most   severe at L5-S1 with grade 2 anterolisthesis and severe bilateral   foraminal stenosis with impingement of the exiting bilateral L5 nerve   roots.Unchanged L4-5 moderate-severe left/moderate right foraminal stenosis..  - Discussed case with neurosurgery, patient could not tolerate standing XR LS due to pain , recommended CT :   - CT LS without contrast done: IMPRESSION:1.  Redemonstrated acute S2 sacral body fracturewith the fracture lines   extending to the bilateral sacral alae likely reflecting sacral   insufficiency fracture aggravated by trauma.2.  Redemonstrated ventral epidural density abutting the sacrum likely   reflecting epidural edema versus hematoma.3.  Redemonstrated L5-S1 grade 2 anterolisthesis and severe bilateral   foraminal stenosis with impingement of the exiting bilateral L5 nerve   roots.  - Discussed with neurosurgery team 11/6 -  NWB for now   - planned for OR cementing/ fixation later this week - will fu once patient at [place]  - date to be determined once patient transferred   - cw pain control, bowel regimen     # Right kidney 1.9 cm angiomyolipoma  - noted on CT  - outpatient follow up with urology    # H/O COPD/Asthma  - not on home O2  - continue albuterol PRN    #H/O Anxiety-started Xanax    # H/O Nicotine dependence - refused nicotine patch/gum - smoking cessation counseling done    dv/ gi ppx/diet  dispo: acute - [facility]  handoff: f/u neurosurgery   family discussion: fiance / partner updated at bedside 11/6 , all questions answered and concerns addressed   signout: given to hospitalist on call and MAR - aware to call neurosx service once patient transferred for in person assessment  SUBJECTIVE:    Patient is a 63y old Female who presents with a chief complaint of   Currently admitted to medicine with the primary diagnosis of Ambulatory dysfunction       Today is hospital day 7d. This morning she is resting comfortably in bed and reports no new issues or overnight events.     ROS:   CONSTITUTIONAL: No weakness, fevers or chills   EYES/ENT: No visual changes; No vertigo or throat pain   NECK: No pain or stiffness   RESPIRATORY: No cough, wheezing, hemoptysis; No shortness of breath   CARDIOVASCULAR: No chest pain or palpitations   GASTROINTESTINAL: No abdominal or epigastric pain. No nausea, vomiting, or hematemesis; No diarrhea or constipation. No melena or hematochezia.  GENITOURINARY: No dysuria, frequency or hematuria  NEUROLOGICAL: No numbness or weakness ; denies loss of bladder or bowel       PAST MEDICAL & SURGICAL HISTORY  Asthma    Chronic obstructive pulmonary disease      SOCIAL HISTORY:    ALLERGIES:  ampicillin (Unknown)    MEDICATIONS:  STANDING MEDICATIONS  acetaminophen     Tablet .. 1000 milliGRAM(s) Oral every 8 hours  ALPRAZolam 0.5 milliGRAM(s) Oral three times a day  heparin   Injectable 5000 Unit(s) SubCutaneous every 8 hours  influenza  Vaccine (HIGH DOSE) 0.5 milliLiter(s) IntraMuscular once  lidocaine   4% Patch 1 Patch Transdermal daily  methocarbamol 500 milliGRAM(s) Oral three times a day  nicotine -   7 mG/24Hr(s) Patch 1 Patch Transdermal daily  pantoprazole    Tablet 40 milliGRAM(s) Oral before breakfast  polyethylene glycol 3350 17 Gram(s) Oral daily  predniSONE   Tablet 60 milliGRAM(s) Oral daily  senna 2 Tablet(s) Oral at bedtime    PRN MEDICATIONS  albuterol    90 MICROgram(s) HFA Inhaler 2 Puff(s) Inhalation every 6 hours PRN  oxyCODONE    IR 5 milliGRAM(s) Oral every 4 hours PRN    VITALS:   T(F): 97.6  HR: 76  BP: 127/61  RR: 18  SpO2: 94%    LABS:                        16.0   10.85 )-----------( 368      ( 06 Nov 2024 06:42 )             47.1     11-06    138  |  102  |  15  ----------------------------<  81  4.6   |  26  |  0.6[L]    Ca    9.5      06 Nov 2024 06:42    TPro  6.1  /  Alb  3.4[L]  /  TBili  0.2  /  DBili  x   /  AST  28  /  ALT  17  /  AlkPhos  196[H]  11-06      Urinalysis Basic - ( 06 Nov 2024 06:42 )    Color: x / Appearance: x / SG: x / pH: x  Gluc: 81 mg/dL / Ketone: x  / Bili: x / Urobili: x   Blood: x / Protein: x / Nitrite: x   Leuk Esterase: x / RBC: x / WBC x   Sq Epi: x / Non Sq Epi: x / Bacteria: x      RADIOLOGY:    PHYSICAL EXAM:  GEN: No acute distress  HEENT: normocephalic, atraumatic, aniceteric  LUNGS: Clear to auscultation bilaterally, no rales/wheezing/ rhonchi  HEART: S1/S2 present. RRR, no murmurs  ABD: Soft, non-tender, non-distended. Bowel sounds present  EXT: b/l LE   NEURO: AAOX3, normal affect , no neurological deficits       ASSESSMENT AND PLAN:    63 year old female with PMH of COPD and asthma  (not on home O2), active smoker, hx multiple falls  presents to ED c/o weakness and difficulty ambulating since last week.     # S2 fracture , non displaced w/ edema   # Bilateral Rib fractures  # Compression deformity of the superior endplate of L2 of indeterminate age  - PT consult-will need STR  - neurology consult appreciated-Patient can follow up outpatient with neurologist, Dr. Greene, for previously ordered EMG and ALIA scan.  -Neuro consult appreciated, MR Brain/C-spine reviewed. Patient can follow up outpatient with neurologist, Dr. Greene, for previously ordered EMG and ALIA scan.  -Adjust analgesia, added Robaxin, steroids  -MR LS:  IMPRESSION:  Nondisplaced acute fracture in the S2 sacral body with extensive sacral   edema, new since the prior lumbar MRI from 4/18/2024. Adjacent sacral   epidural fluid signal results in moderate narrowing of the thecal sac.   The finding could represent edema and/or epidural hematoma.  Unchanged multilevel lumbar spondylosis and spondylolisthesis, most   severe at L5-S1 with grade 2 anterolisthesis and severe bilateral   foraminal stenosis with impingement of the exiting bilateral L5 nerve   roots.Unchanged L4-5 moderate-severe left/moderate right foraminal stenosis..  - Discussed case with neurosurgery, patient could not tolerate standing XR LS due to pain , recommended CT :   - CT LS without contrast done: IMPRESSION:1.  Redemonstrated acute S2 sacral body fracturewith the fracture lines   extending to the bilateral sacral alae likely reflecting sacral   insufficiency fracture aggravated by trauma.2.  Redemonstrated ventral epidural density abutting the sacrum likely   reflecting epidural edema versus hematoma.3.  Redemonstrated L5-S1 grade 2 anterolisthesis and severe bilateral   foraminal stenosis with impingement of the exiting bilateral L5 nerve   roots.  - Discussed with neurosurgery team 11/6 -  NWB for now   - planned for OR cementing/ fixation later this week - will fu once patient at [place]  - date to be determined once patient transferred  (discussed with patient)  - cw pain control, bowel regimen     # Right kidney 1.9 cm angiomyolipoma  - noted on CT  - outpatient follow up with urology    # H/O COPD/Asthma  - not on home O2  - continue albuterol PRN    #H/O Anxiety-started Xanax    # H/O Nicotine dependence - refused nicotine patch/gum - smoking cessation counseling     dv/ gi ppx/diet  dispo: acute - [facility]  handoff: f/u neurosurgery   family discussion: fiance / partner updated at bedside 11/6 , all questions answered and concerns addressed   signout: given to hospitalist on call and MAR - aware to call neurosx service once patient transferred for in person assessment  SUBJECTIVE:    Patient is a 63y old Female who presents with a chief complaint of   Currently admitted to medicine with the primary diagnosis of Ambulatory dysfunction       Today is hospital day 7d. This morning she is resting comfortably in bed and reports no new issues or overnight events.     ROS:   CONSTITUTIONAL: No weakness, fevers or chills   EYES/ENT: No visual changes; No vertigo or throat pain   NECK: No pain or stiffness   RESPIRATORY: No cough, wheezing, hemoptysis; No shortness of breath   CARDIOVASCULAR: No chest pain or palpitations   GASTROINTESTINAL: No abdominal or epigastric pain. No nausea, vomiting, or hematemesis; No diarrhea or constipation. No melena or hematochezia.  GENITOURINARY: No dysuria, frequency or hematuria  NEUROLOGICAL: No numbness or weakness ; denies loss of bladder or bowel       PAST MEDICAL & SURGICAL HISTORY  Asthma    Chronic obstructive pulmonary disease      SOCIAL HISTORY:    ALLERGIES:  ampicillin (Unknown)    MEDICATIONS:  STANDING MEDICATIONS  acetaminophen     Tablet .. 1000 milliGRAM(s) Oral every 8 hours  ALPRAZolam 0.5 milliGRAM(s) Oral three times a day  heparin   Injectable 5000 Unit(s) SubCutaneous every 8 hours  influenza  Vaccine (HIGH DOSE) 0.5 milliLiter(s) IntraMuscular once  lidocaine   4% Patch 1 Patch Transdermal daily  methocarbamol 500 milliGRAM(s) Oral three times a day  nicotine -   7 mG/24Hr(s) Patch 1 Patch Transdermal daily  pantoprazole    Tablet 40 milliGRAM(s) Oral before breakfast  polyethylene glycol 3350 17 Gram(s) Oral daily  predniSONE   Tablet 60 milliGRAM(s) Oral daily  senna 2 Tablet(s) Oral at bedtime    PRN MEDICATIONS  albuterol    90 MICROgram(s) HFA Inhaler 2 Puff(s) Inhalation every 6 hours PRN  oxyCODONE    IR 5 milliGRAM(s) Oral every 4 hours PRN    VITALS:   T(F): 97.6  HR: 76  BP: 127/61  RR: 18  SpO2: 94%    LABS:                        16.0   10.85 )-----------( 368      ( 06 Nov 2024 06:42 )             47.1     11-06    138  |  102  |  15  ----------------------------<  81  4.6   |  26  |  0.6[L]    Ca    9.5      06 Nov 2024 06:42    TPro  6.1  /  Alb  3.4[L]  /  TBili  0.2  /  DBili  x   /  AST  28  /  ALT  17  /  AlkPhos  196[H]  11-06      Urinalysis Basic - ( 06 Nov 2024 06:42 )    Color: x / Appearance: x / SG: x / pH: x  Gluc: 81 mg/dL / Ketone: x  / Bili: x / Urobili: x   Blood: x / Protein: x / Nitrite: x   Leuk Esterase: x / RBC: x / WBC x   Sq Epi: x / Non Sq Epi: x / Bacteria: x      RADIOLOGY:    PHYSICAL EXAM:  GEN: No acute distress  HEENT: normocephalic, atraumatic, aniceteric  LUNGS: Clear to auscultation bilaterally, no rales/wheezing/ rhonchi  HEART: S1/S2 present. RRR, no murmurs  ABD: Soft, non-tender, non-distended. Bowel sounds present  EXT: b/l LE   NEURO: AAOX3, normal affect , no neurological deficits       ASSESSMENT AND PLAN:    63 year old female with PMH of COPD and asthma  (not on home O2), active smoker, hx multiple falls  presents to ED c/o weakness and difficulty ambulating since last week.     # S2 fracture , non displaced w/ edema   # Bilateral Rib fractures  # Compression deformity of the superior endplate of L2 of indeterminate age  - PT consult-will need STR  -Neurology consult appreciated-Patient can follow up outpatient with neurologist, Dr. Greene, for previously ordered EMG and ALIA scan.  - MR Brain/C-spine reviewed. Patient can follow up outpatient with neurologist, Dr. Greene, for previously ordered EMG and ALIA scan.  -MR LS:  IMPRESSION:  Nondisplaced acute fracture in the S2 sacral body with extensive sacral   edema, new since the prior lumbar MRI from 4/18/2024. Adjacent sacral   epidural fluid signal results in moderate narrowing of the thecal sac.   The finding could represent edema and/or epidural hematoma.  Unchanged multilevel lumbar spondylosis and spondylolisthesis, most   severe at L5-S1 with grade 2 anterolisthesis and severe bilateral   foraminal stenosis with impingement of the exiting bilateral L5 nerve   roots.Unchanged L4-5 moderate-severe left/moderate right foraminal stenosis..  - Discussed case with neurosurgery, patient could not tolerate standing XR LS due to pain , recommended CT :   - CT LS without contrast done: IMPRESSION:1.  Redemonstrated acute S2 sacral body fracturewith the fracture lines   extending to the bilateral sacral alae likely reflecting sacral   insufficiency fracture aggravated by trauma.2.  Redemonstrated ventral epidural density abutting the sacrum likely   reflecting epidural edema versus hematoma.3.  Redemonstrated L5-S1 grade 2 anterolisthesis and severe bilateral   foraminal stenosis with impingement of the exiting bilateral L5 nerve   roots.  - Discussed with neurosurgery team 11/6 -  NWB for now   - planned for OR cementing/ fixation later this week - will fu once patient at [place]  - date to be determined once patient transferred to [place] site ; per service - transfer uner medicine until in person evaluation   - above was discussed with patient and fiance at bedside 11/6 ,agreeable to transfer and OR   - cw pain control + prednisone , bowel regimen     # Right kidney 1.9 cm angiomyolipoma  - noted on CT  - outpatient follow up with urology    # H/O COPD/Asthma  - not on home O2  - continue albuterol PRN    #H/O Anxiety-started Xanax    # H/O Nicotine dependence - refused nicotine patch/gum - smoking cessation counseling     dv/ gi ppx/diet  dispo: acute - [facility]  handoff: f/u neurosurgery   family discussion: fiance / partner updated at bedside 11/6 , all questions answered and concerns addressed   signout: given to hospitalist on call and MAR - aware to call neurosx service once patient transferred for in person assessment

## 2024-11-07 PROCEDURE — 99232 SBSQ HOSP IP/OBS MODERATE 35: CPT

## 2024-11-07 RX ADMIN — Medication 0.5 MILLIGRAM(S): at 13:02

## 2024-11-07 RX ADMIN — Medication 1000 MILLIGRAM(S): at 06:01

## 2024-11-07 RX ADMIN — Medication 1000 MILLIGRAM(S): at 13:02

## 2024-11-07 RX ADMIN — Medication 1000 MILLIGRAM(S): at 06:12

## 2024-11-07 RX ADMIN — METHOCARBAMOL 500 MILLIGRAM(S): 500 TABLET ORAL at 22:20

## 2024-11-07 RX ADMIN — PANTOPRAZOLE SODIUM 40 MILLIGRAM(S): 40 TABLET, DELAYED RELEASE ORAL at 06:01

## 2024-11-07 RX ADMIN — Medication 0.5 MILLIGRAM(S): at 06:01

## 2024-11-07 RX ADMIN — Medication 2 TABLET(S): at 22:20

## 2024-11-07 RX ADMIN — Medication 1000 MILLIGRAM(S): at 13:37

## 2024-11-07 RX ADMIN — METHOCARBAMOL 500 MILLIGRAM(S): 500 TABLET ORAL at 13:03

## 2024-11-07 RX ADMIN — Medication 1000 MILLIGRAM(S): at 22:20

## 2024-11-07 RX ADMIN — Medication 0.5 MILLIGRAM(S): at 22:20

## 2024-11-07 RX ADMIN — METHOCARBAMOL 500 MILLIGRAM(S): 500 TABLET ORAL at 06:01

## 2024-11-07 RX ADMIN — Medication 60 MILLIGRAM(S): at 06:01

## 2024-11-07 NOTE — PROGRESS NOTE ADULT - SUBJECTIVE AND OBJECTIVE BOX
SUBJECTIVE/OVERNIGHT EVENTS  Today is hospital day 8d. This morning patient was seen and examined at bedside, resting comfortably in bed. No acute or major events overnight.    CODE STATUS: FULL (Check)     MEDICATIONS  STANDING MEDICATIONS  acetaminophen     Tablet .. 1000 milliGRAM(s) Oral every 8 hours  ALPRAZolam 0.5 milliGRAM(s) Oral three times a day  heparin   Injectable 5000 Unit(s) SubCutaneous every 8 hours  influenza  Vaccine (HIGH DOSE) 0.5 milliLiter(s) IntraMuscular once  lidocaine   4% Patch 1 Patch Transdermal daily  methocarbamol 500 milliGRAM(s) Oral three times a day  nicotine -   7 mG/24Hr(s) Patch 1 Patch Transdermal daily  pantoprazole    Tablet 40 milliGRAM(s) Oral before breakfast  polyethylene glycol 3350 17 Gram(s) Oral daily  predniSONE   Tablet 60 milliGRAM(s) Oral daily  senna 2 Tablet(s) Oral at bedtime    PRN MEDICATIONS  albuterol    90 MICROgram(s) HFA Inhaler 2 Puff(s) Inhalation every 6 hours PRN  oxyCODONE    IR 5 milliGRAM(s) Oral every 4 hours PRN    VITALS  T(F): 97.8 (11-07-24 @ 07:51), Max: 97.9 (11-06-24 @ 14:11)  HR: 90 (11-07-24 @ 07:51) (74 - 90)  BP: 123/77 (11-07-24 @ 07:51) (91/58 - 123/77)  RR: 18 (11-07-24 @ 07:51) (18 - 18)  SpO2: 95% (11-07-24 @ 07:51) (91% - 95%)    PHYSICAL EXAM  GENERAL  ( x ) NAD, lying in bed comfortably     (  ) obtunded     (  ) lethargic     (  ) somnolent    HEAD  ( x ) Atraumatic     (  ) hematoma     (  ) laceration (specify location:       )     NECK  ( x ) Supple     (  ) neck stiffness     (  ) nuchal rigidity     (  )  no JVD     (  ) JVD present ( -- cm)    HEART  Rate -->  ( x ) normal rate    (  ) bradycardic    (  ) tachycardic  Rhythm -->  ( x ) regular    (  ) regularly irregular    (  ) irregularly irregular  Murmurs -->  ( x ) normal s1/s2    (  ) systolic murmur    (  ) diastolic murmur    (  ) continuous murmur     (  ) S3 present    (  ) S4 present    LUNGS  (x  )Unlabored respirations     (  ) tachypnea  ( x ) B/L air entry     (  ) decreased breath sounds in:  (location     )    (  ) no adventitious sound     (  ) crackles     (  ) wheezing      (  ) rhonchi      (specify location:       )  (  ) chest wall tenderness (specify location:       )    ABDOMEN  ( x ) Soft     (  ) tense   |   (  ) nondistended     (  ) distended   |   (  ) +BS     (  ) hypoactive bowel sounds     (  ) hyperactive bowel sounds  (  ) nontender     (  ) RUQ tenderness     (  ) RLQ tenderness     (  ) LLQ tenderness     (  ) epigastric tenderness     (  ) diffuse tenderness  (  ) Splenomegaly      (  ) Hepatomegaly      (  ) Jaundice     (  ) ecchymosis     NERVOUS SYSTEM  ( x ) A&Ox3     (  ) confused     (  ) lethargic  CN II-XII:     (  ) Intact     (  ) focal deficits  (Specify:     )   Upper extremities:     (  ) strength X/5     (  ) focal deficit (specify:    )  Lower extremities:     (  ) strength  X/5    (  ) focal deficit (specify:    )    SKIN  ( x) No rashes or lesions     (  ) maculopapular rash     (  ) pustules     (  ) vesicles     (  ) ulcer     (  ) ecchymosis     (specify location:     )    LABS             16.0   10.85 )-----------( 368      ( 11-06-24 @ 06:42 )             47.1     138  |  102  |  15  -------------------------<  81   11-06-24 @ 06:42  4.6  |  26  |  0.6    Ca      9.5     11-06-24 @ 06:42    TPro  6.1  /  Alb  3.4  /  TBili  0.2  /  DBili  x   /  AST  28  /  ALT  17  /  AlkPhos  196  /  GGT  x     11-06-24 @ 06:42        Urinalysis Basic - ( 06 Nov 2024 06:42 )    Color: x / Appearance: x / SG: x / pH: x  Gluc: 81 mg/dL / Ketone: x  / Bili: x / Urobili: x   Blood: x / Protein: x / Nitrite: x   Leuk Esterase: x / RBC: x / WBC x   Sq Epi: x / Non Sq Epi: x / Bacteria: x          IMAGING SUBJECTIVE/OVERNIGHT EVENTS  Today is hospital day 8d. This morning patient was seen and examined at bedside, resting comfortably in bed. No acute or major events overnight.    CODE STATUS: FULL     MEDICATIONS  STANDING MEDICATIONS  acetaminophen     Tablet .. 1000 milliGRAM(s) Oral every 8 hours  ALPRAZolam 0.5 milliGRAM(s) Oral three times a day  heparin   Injectable 5000 Unit(s) SubCutaneous every 8 hours  influenza  Vaccine (HIGH DOSE) 0.5 milliLiter(s) IntraMuscular once  lidocaine   4% Patch 1 Patch Transdermal daily  methocarbamol 500 milliGRAM(s) Oral three times a day  nicotine -   7 mG/24Hr(s) Patch 1 Patch Transdermal daily  pantoprazole    Tablet 40 milliGRAM(s) Oral before breakfast  polyethylene glycol 3350 17 Gram(s) Oral daily  predniSONE   Tablet 60 milliGRAM(s) Oral daily  senna 2 Tablet(s) Oral at bedtime    PRN MEDICATIONS  albuterol    90 MICROgram(s) HFA Inhaler 2 Puff(s) Inhalation every 6 hours PRN  oxyCODONE    IR 5 milliGRAM(s) Oral every 4 hours PRN    VITALS  T(F): 97.8 (11-07-24 @ 07:51), Max: 97.9 (11-06-24 @ 14:11)  HR: 90 (11-07-24 @ 07:51) (74 - 90)  BP: 123/77 (11-07-24 @ 07:51) (91/58 - 123/77)  RR: 18 (11-07-24 @ 07:51) (18 - 18)  SpO2: 95% (11-07-24 @ 07:51) (91% - 95%)    PHYSICAL EXAM  GENERAL  ( x ) NAD, lying in bed comfortably     (  ) obtunded     (  ) lethargic     (  ) somnolent    HEAD  ( x ) Atraumatic     (  ) hematoma     (  ) laceration (specify location:       )     NECK  ( x ) Supple     (  ) neck stiffness     (  ) nuchal rigidity     (  )  no JVD     (  ) JVD present ( -- cm)    HEART  Rate -->  ( x ) normal rate    (  ) bradycardic    (  ) tachycardic  Rhythm -->  ( x ) regular    (  ) regularly irregular    (  ) irregularly irregular  Murmurs -->  ( x ) normal s1/s2    (  ) systolic murmur    (  ) diastolic murmur    (  ) continuous murmur     (  ) S3 present    (  ) S4 present    LUNGS  (x  )Unlabored respirations     (  ) tachypnea  ( x ) B/L air entry     (  ) decreased breath sounds in:  (location     )    (  ) no adventitious sound     (  ) crackles     (  ) wheezing      (  ) rhonchi      (specify location:       )  (  ) chest wall tenderness (specify location:       )    ABDOMEN  ( x ) Soft     (  ) tense   |   (  ) nondistended     (  ) distended   |   (  ) +BS     (  ) hypoactive bowel sounds     (  ) hyperactive bowel sounds  (  ) nontender     (  ) RUQ tenderness     (  ) RLQ tenderness     (  ) LLQ tenderness     (  ) epigastric tenderness     (  ) diffuse tenderness  (  ) Splenomegaly      (  ) Hepatomegaly      (  ) Jaundice     (  ) ecchymosis     NERVOUS SYSTEM  ( x ) A&Ox3     (  ) confused     (  ) lethargic  CN II-XII:     (  ) Intact     (  ) focal deficits  (Specify:     )   Upper extremities:     (  ) strength X/5     (  ) focal deficit (specify:    )  Lower extremities:     (  ) strength  X/5    (  ) focal deficit (specify:    )    SKIN  ( x) No rashes or lesions     (  ) maculopapular rash     (  ) pustules     (  ) vesicles     (  ) ulcer     (  ) ecchymosis     (specify location:     )    LABS             16.0   10.85 )-----------( 368      ( 11-06-24 @ 06:42 )             47.1     138  |  102  |  15  -------------------------<  81   11-06-24 @ 06:42  4.6  |  26  |  0.6    Ca      9.5     11-06-24 @ 06:42    TPro  6.1  /  Alb  3.4  /  TBili  0.2  /  DBili  x   /  AST  28  /  ALT  17  /  AlkPhos  196  /  GGT  x     11-06-24 @ 06:42        Urinalysis Basic - ( 06 Nov 2024 06:42 )    Color: x / Appearance: x / SG: x / pH: x  Gluc: 81 mg/dL / Ketone: x  / Bili: x / Urobili: x   Blood: x / Protein: x / Nitrite: x   Leuk Esterase: x / RBC: x / WBC x   Sq Epi: x / Non Sq Epi: x / Bacteria: x          IMAGING

## 2024-11-07 NOTE — PATIENT PROFILE ADULT - NSPROPTRIGHTSUPPORTPERSON_GEN_A_NUR
Physical Therapy Visit    Visit Type: Daily Treatment Note  Visit: 4  Referring Provider: Sukhdeep Majano DO  Medical Diagnosis (from order): M67.911 - Tendinopathy of right shoulder     SUBJECTIVE                                                                                                               C/o improving , end range 3/10. Pain is usually more in the morning      OBJECTIVE                                                                                                                     Range of Motion (ROM)   (degrees unless noted; active unless noted; norms in ( ); negative=lacking to 0, positive=beyond 0)  WFL: LUE, RUE  WFL: LLE, RLE, except as noted  Comments: Pain at end range all direction RUE pretreatment    Strength  (out of 5 unless noted, standard test position unless noted)   5/5: LUE, RUE  Cervical: WFL       Muscle Strength Testing (out of 5 unless otherwise indicated):  - Rhomboids:        Right: 4+  - Middle Trapezius:        Left: 4+        Right: 4+  - Lower Trapezius:        Left: 4        Right: 4  - Serratus Anterior:        Left: 4+        Right: 4+     Palpation  Tender bilaterally bicep tendon.                  Treatment     Therapeutic Exercise  Performed to improve range of motion for increased independence and improved functional mobility with kitchen tasks and dressing    See HEP      Manual Therapy   Performed in order to address joint osteokinematics for improved mobility and increased independence with grooming/hygiene, reaching, and dressing  Grade 2-3 glenohumeral joint , acromial claviculr joint, sternoclavicular joint , C4-5  Soft tissue mobilization right teres, suboccipital, upper trap       Skilled input: facilitation and demonstration  for clothing adjustments for techniques, hand placement and palpation for techniques and therapist position for techniques as described above and how they are pertinent to the patient's plan of care.  Home Exercise Program  Access  Code: [email or web address]  URL: [email or web address]  Date: 11/07/2024  Prepared by: Rosa Batista    Exercises  - Wall Push Up with Arms Wide  - 1 x daily - 7 x weekly - 3 sets - 10 reps  - Plank with Thoracic Rotation on Counter  - 3 x daily - 7 x weekly - 1 sets - 10 reps  - Standing Shoulder Posterior Capsule Stretch  - 2 x daily - 7 x weekly - 3 sets - 1 reps - 10 hold  - Standing Bilateral Low Shoulder Row with Anchored Resistance  - 1 x daily - 7 x weekly - 3 sets - 10 reps  - Standing High Row with Resistance  - 1 x daily - 7 x weekly - 3 sets - 10 reps  - Standing Shoulder Internal Rotation Stretch with Towel  - 1 x daily - 7 x weekly - 3 sets - 3 reps - 10 hold  - Standing External Rotation at 90 with Dumbbell  - 1 x daily - 7 x weekly - 3 sets - 10 reps  - Doorway Pec Stretch at 90 Degrees Abduction  - 1 x daily - 7 x weekly - 1 sets - 3 reps - 10 hold      ASSESSMENT                                                                                                            Focus of today's treatment was on progressing home exercise program and improving right shoulder complex mobility. Patient with improved mobility as evidenced by improved pain on AROM. Patient educated on HEP. Continued skilled therapy is medically necessary for  her to facilitate progress towards stated goals.   Education:   - Results of above outlined education: Demonstrates understanding    PLAN                                                                                                                           Suggestions for next session as indicated: Progress per plan of care, re eval for discharge     Goals  Long Term Goals: to be met by end of plan of care  1. Improve shoulder ROM to WFL to allow patient to perform grooming and dressing techniques, reach into cabinets and drive with less c/o pain and difficulty.      2. Improved strength 1/2 to 1 grade to improve ability to lift 10 pounds to lift laundry  basket, and groceries from floor to table height with less pain and difficulty.      3. Improved shoulder complex mobility to assist in ROM goals and reduce c/o pain to allow patient to return house cleaning    4. Patient will be independent with progressed and modified home exercise program.      Therapy procedure time and total treatment time can be found documented on the Time Entry flowsheet     declines

## 2024-11-07 NOTE — PROGRESS NOTE ADULT - SUBJECTIVE AND OBJECTIVE BOX
SUBJECTIVE:    Patient is a 63y old Female who presents with a chief complaint of s/p fall (07 Nov 2024 02:52)    Currently admitted to medicine with the primary diagnosis of Ambulatory dysfunction       Today is hospital day 8d.     PAST MEDICAL & SURGICAL HISTORY  Asthma    Chronic obstructive pulmonary disease      ALLERGIES:  ampicillin (Unknown)    MEDICATIONS:  STANDING MEDICATIONS  acetaminophen     Tablet .. 1000 milliGRAM(s) Oral every 8 hours  ALPRAZolam 0.5 milliGRAM(s) Oral three times a day  heparin   Injectable 5000 Unit(s) SubCutaneous every 8 hours  influenza  Vaccine (HIGH DOSE) 0.5 milliLiter(s) IntraMuscular once  lidocaine   4% Patch 1 Patch Transdermal daily  methocarbamol 500 milliGRAM(s) Oral three times a day  pantoprazole    Tablet 40 milliGRAM(s) Oral before breakfast  polyethylene glycol 3350 17 Gram(s) Oral daily  predniSONE   Tablet 60 milliGRAM(s) Oral daily  senna 2 Tablet(s) Oral at bedtime    PRN MEDICATIONS  albuterol    90 MICROgram(s) HFA Inhaler 2 Puff(s) Inhalation every 6 hours PRN  oxyCODONE    IR 5 milliGRAM(s) Oral every 4 hours PRN    VITALS:   T(F): 98.2  HR: 76  BP: 113/65  RR: 18  SpO2: 95%    LABS:                        16.0   10.85 )-----------( 368      ( 06 Nov 2024 06:42 )             47.1     11-06    138  |  102  |  15  ----------------------------<  81  4.6   |  26  |  0.6[L]    Ca    9.5      06 Nov 2024 06:42    TPro  6.1  /  Alb  3.4[L]  /  TBili  0.2  /  DBili  x   /  AST  28  /  ALT  17  /  AlkPhos  196[H]  11-06      Urinalysis Basic - ( 06 Nov 2024 06:42 )    Color: x / Appearance: x / SG: x / pH: x  Gluc: 81 mg/dL / Ketone: x  / Bili: x / Urobili: x   Blood: x / Protein: x / Nitrite: x   Leuk Esterase: x / RBC: x / WBC x   Sq Epi: x / Non Sq Epi: x / Bacteria: x                RADIOLOGY:    PHYSICAL EXAM:  GEN: No acute distress  LUNGS: Clear to auscultation bilaterally   HEART: S1/S2 present. RRR.   ABD/ GI: Soft, non-tender, non-distended. Bowel sounds present  EXT: NC/NC/NE/2+PP/MCADAMS  NEURO: AAOX3

## 2024-11-07 NOTE — PROGRESS NOTE ADULT - ASSESSMENT
63 year old female with PMH of COPD and asthma  (not on home O2), active smoker, hx multiple falls  presents to ED c/o weakness and difficulty ambulating since last week.     # S2 fracture , non displaced w/ edema   # Bilateral Rib fractures  # Compression deformity of the superior endplate of L2 of indeterminate age  - PT consult-will need STR  -Neurology consult appreciated-Patient can follow up outpatient with neurologist, Dr. Greene, for previously ordered EMG and ALIA scan.  - MR Brain/C-spine reviewed. Multilevel degenerative changes contributing to moderate to severe bilateral foraminal narrowing, suboptimally evaluated.  -MR LS: Nondisplaced acute fracture in the S2 sacral body with extensive sacral edema, new since the prior lumbar MRI from 4/18/2024. Adjacent sacral epidural fluid signal results in moderate narrowing of the thecal sac. The finding could represent edema and/or epidural hematoma. Unchanged multilevel lumbar spondylosis and spondylolisthesis, most severe at L5-S1 with grade 2 anterolisthesis and severe bilateral foraminal stenosis with impingement of the exiting bilateral L5 nerve roots.Unchanged L4-5 moderate-severe left/moderate right foraminal stenosis.  - Discussed case with neurosurgery, patient could not tolerate standing XR LS due to pain, recommended CT :   - Discussed with neurosurgery team 11/6 -  NWB for now   - planned for OR cementing/ fixation later this week. OR date TBD  - cw pain control oxycodone IR 5mg PO q4 PRN, acetaminophen 1000mg q8   - Pain Management Consult- Pending  - Steroid taper, prednisone 60mg   - Medical Clearance for OR potentially Monday or Friday- Pending  - bowel regimen   - Standing XRay once pain better managed.     # Right kidney 1.9 cm angiomyolipoma  - noted on CT  - outpatient follow up with urology    # H/O COPD/Asthma  - not on home O2  - continue albuterol PRN    #H/O Anxiety-started Xanax    # H/O Nicotine dependence   - refused nicotine patch/gum   - smoking cessation counseling     #Diet: Regular  #DVT pro: Heparin 5000units q8  #GI pro: Pantoprazole 40mg   #Pending:  OR Friday or Monday  Pain Management Consult  Pain Control for Standing Xray

## 2024-11-07 NOTE — PROGRESS NOTE ADULT - ASSESSMENT
imp; 62 y/o F s/p fall with Sacrum fracture extending from s2 body with ventral epidural lesion with impingement of L5    Plan: Can start on decadron taper for adjunct painmanagement etc         need medical and cardiac clearance for possible OR          Review CT of Lspine         ATtempt Standing Xrays once pain is better managed         Pain Management consult     imp; 62 y/o F s/p fall with Sacrum fracture extending from s2 body with ventral epidural lesion with impingement of L5    Plan: Can start on decadron taper for adjunct pain management etc         Will need medical and cardiac clearance for possible OR Monday vs friday..          Review CT of Lspine         Attempt Standing Xrays once pain is better managed         Pain Management consult    case discussed with Dr Avalos

## 2024-11-07 NOTE — PROGRESS NOTE ADULT - ASSESSMENT
63 year old female with PMH of COPD and asthma  (not on home O2), active smoker, hx multiple falls  presents to ED c/o weakness and difficulty ambulating since last week.     # S2 fracture , non displaced w/ edema   # Bilateral Rib fractures  # Compression deformity of the superior endplate of L2 of indeterminate age  --Neurology consult appreciated-Patient can follow up outpatient with neurologist, Dr. Greene, for previously ordered EMG and ALIA scan.  - MR Brain/C-spine reviewed. Patient can follow up outpatient with neurologist, Dr. Greene, for previously ordered EMG and ALIA scan.  -MR LS:  IMPRESSION:  Nondisplaced acute fracture in the S2 sacral body with extensive sacral   edema, new since the prior lumbar MRI from 4/18/2024. Adjacent sacral   epidural fluid signal results in moderate narrowing of the thecal sac.   The finding could represent edema and/or epidural hematoma.  Unchanged multilevel lumbar spondylosis and spondylolisthesis, most   severe at L5-S1 with grade 2 anterolisthesis and severe bilateral   foraminal stenosis with impingement of the exiting bilateral L5 nerve   roots.Unchanged L4-5 moderate-severe left/moderate right foraminal stenosis..  - - Discussed with neurosurgery team 11/6 -  NWB for now   - planned for OR cementing/ fixation later this week - likely next monday  -  cw pain control + prednisone , bowel regimen     # Right kidney 1.9 cm angiomyolipoma  - noted on CT  - outpatient follow up with urology    # H/O COPD/Asthma  - not on home O2  - continue albuterol PRN    #H/O Anxiety-started Xanax    # H/O Nicotine dependence - refused nicotine patch/gum - smoking cessation counseling     DVT Px on heparin   GI-- protonix   Patient has good exercise tolerance-- has been a smoker till she came here.  Will check EKG-- no cardiac issues known  medical clearance after EKG
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
## 2024-11-08 PROCEDURE — 99232 SBSQ HOSP IP/OBS MODERATE 35: CPT

## 2024-11-08 PROCEDURE — 93010 ELECTROCARDIOGRAM REPORT: CPT

## 2024-11-08 RX ADMIN — Medication 1000 MILLIGRAM(S): at 04:46

## 2024-11-08 RX ADMIN — METHOCARBAMOL 500 MILLIGRAM(S): 500 TABLET ORAL at 13:11

## 2024-11-08 RX ADMIN — PANTOPRAZOLE SODIUM 40 MILLIGRAM(S): 40 TABLET, DELAYED RELEASE ORAL at 05:38

## 2024-11-08 RX ADMIN — POLYETHYLENE GLYCOL 3350 17 GRAM(S): 17 POWDER, FOR SOLUTION ORAL at 11:09

## 2024-11-08 RX ADMIN — Medication 0.5 MILLIGRAM(S): at 13:11

## 2024-11-08 RX ADMIN — Medication 1000 MILLIGRAM(S): at 14:00

## 2024-11-08 RX ADMIN — Medication 0.5 MILLIGRAM(S): at 05:38

## 2024-11-08 RX ADMIN — METHOCARBAMOL 500 MILLIGRAM(S): 500 TABLET ORAL at 21:11

## 2024-11-08 RX ADMIN — Medication 0.5 MILLIGRAM(S): at 21:10

## 2024-11-08 RX ADMIN — HEPARIN SODIUM 5000 UNIT(S): 10000 INJECTION INTRAVENOUS; SUBCUTANEOUS at 13:11

## 2024-11-08 RX ADMIN — Medication 1000 MILLIGRAM(S): at 21:10

## 2024-11-08 RX ADMIN — METHOCARBAMOL 500 MILLIGRAM(S): 500 TABLET ORAL at 05:39

## 2024-11-08 RX ADMIN — Medication 40 MILLIGRAM(S): at 05:39

## 2024-11-08 RX ADMIN — Medication 1000 MILLIGRAM(S): at 13:11

## 2024-11-08 RX ADMIN — Medication 2 TABLET(S): at 21:10

## 2024-11-08 RX ADMIN — LIDOCAINE HYDROCHLORIDE 1 PATCH: 40 SOLUTION TOPICAL at 11:08

## 2024-11-08 RX ADMIN — Medication 1000 MILLIGRAM(S): at 05:39

## 2024-11-08 NOTE — PROGRESS NOTE ADULT - ASSESSMENT
63 year old female with PMH of COPD and asthma  (not on home O2), active smoker, hx multiple falls  presents to ED c/o weakness and difficulty ambulating since last week.     # S2 fracture , non displaced w/ edema   # Bilateral Rib fractures  # Compression deformity of the superior endplate of L2 of indeterminate age  --Neurology consult appreciated-Patient can follow up outpatient with neurologist, Dr. Greene, for previously ordered EMG and ALIA scan.  - MR Brain/C-spine reviewed. Patient can follow up outpatient with neurologist, Dr. Greene, for previously ordered EMG and ALIA scan.  -MR LS:  IMPRESSION:  Nondisplaced acute fracture in the S2 sacral body with extensive sacral   edema, new since the prior lumbar MRI from 4/18/2024. Adjacent sacral   epidural fluid signal results in moderate narrowing of the thecal sac.   The finding could represent edema and/or epidural hematoma.  Unchanged multilevel lumbar spondylosis and spondylolisthesis, most   severe at L5-S1 with grade 2 anterolisthesis and severe bilateral   foraminal stenosis with impingement of the exiting bilateral L5 nerve   roots.Unchanged L4-5 moderate-severe left/moderate right foraminal stenosis..  - - Discussed with neurosurgery team 11/6 -  NWB for now   - planned for OR cementing/ fixation later this week - likely next monday  -  cw pain control + prednisone , bowel regimen     # Right kidney 1.9 cm angiomyolipoma  - noted on CT  - outpatient follow up with urology    # H/O COPD/Asthma  - not on home O2  - continue albuterol PRN    #H/O Anxiety-started Xanax    # H/O Nicotine dependence - refused nicotine patch/gum - smoking cessation counseling     DVT Px on heparin   GI-- protonix   Patient has good exercise tolerance-- has been a smoker till she came here.  EKG- sinus tachycardia rate 105/min- no cardiac issues known and no family hx of CAD   moderate risk fro procedure-- she walked today with PT for several feet but no documentation-- I spoke with Physical therapist and patient for confirmation.   Surgery maynot be needed if she does well with PT.spoke with neurosurgery

## 2024-11-08 NOTE — PROGRESS NOTE ADULT - SUBJECTIVE AND OBJECTIVE BOX
SUBJECTIVE:    Patient is a 63y old Female who presents with a chief complaint of s/p fall (07 Nov 2024 02:52)    Currently admitted to medicine with the primary diagnosis of Ambulatory dysfunction       Today is hospital day 9d.     PAST MEDICAL & SURGICAL HISTORY  Asthma    Chronic obstructive pulmonary disease      ALLERGIES:  ampicillin (Unknown)    MEDICATIONS:  STANDING MEDICATIONS  acetaminophen     Tablet .. 1000 milliGRAM(s) Oral every 8 hours  ALPRAZolam 0.5 milliGRAM(s) Oral three times a day  heparin   Injectable 5000 Unit(s) SubCutaneous every 8 hours  influenza  Vaccine (HIGH DOSE) 0.5 milliLiter(s) IntraMuscular once  lidocaine   4% Patch 1 Patch Transdermal daily  methocarbamol 500 milliGRAM(s) Oral three times a day  pantoprazole    Tablet 40 milliGRAM(s) Oral before breakfast  polyethylene glycol 3350 17 Gram(s) Oral daily  senna 2 Tablet(s) Oral at bedtime    PRN MEDICATIONS  albuterol    90 MICROgram(s) HFA Inhaler 2 Puff(s) Inhalation every 6 hours PRN  oxyCODONE    IR 5 milliGRAM(s) Oral every 4 hours PRN    VITALS:   T(F): 98.1  HR: 92  BP: 114/71  RR: 18  SpO2: 92%    LABS:                        RADIOLOGY:    PHYSICAL EXAM:  GEN: No acute distress  LUNGS: Clear to auscultation bilaterally   HEART: S1/S2 present. RRR.   ABD/ GI: Soft, non-tender, non-distended. Bowel sounds present  EXT: NC/NC/NE/2+PP/[facility]AD[facility]  NEURO: [id number]

## 2024-11-09 LAB
ALBUMIN SERPL ELPH-MCNC: 3.2 G/DL — LOW (ref 3.5–5.2)
ALP SERPL-CCNC: 258 U/L — HIGH (ref 30–115)
ALT FLD-CCNC: 23 U/L — SIGNIFICANT CHANGE UP (ref 0–41)
ANION GAP SERPL CALC-SCNC: 9 MMOL/L — SIGNIFICANT CHANGE UP (ref 7–14)
AST SERPL-CCNC: 21 U/L — SIGNIFICANT CHANGE UP (ref 0–41)
BASOPHILS # BLD AUTO: 0 K/UL — SIGNIFICANT CHANGE UP (ref 0–0.2)
BASOPHILS NFR BLD AUTO: 0 % — SIGNIFICANT CHANGE UP (ref 0–1)
BILIRUB SERPL-MCNC: <0.2 MG/DL — SIGNIFICANT CHANGE UP (ref 0.2–1.2)
BUN SERPL-MCNC: 26 MG/DL — HIGH (ref 10–20)
CALCIUM SERPL-MCNC: 9.6 MG/DL — SIGNIFICANT CHANGE UP (ref 8.4–10.5)
CHLORIDE SERPL-SCNC: 107 MMOL/L — SIGNIFICANT CHANGE UP (ref 98–110)
CO2 SERPL-SCNC: 24 MMOL/L — SIGNIFICANT CHANGE UP (ref 17–32)
CREAT SERPL-MCNC: 0.8 MG/DL — SIGNIFICANT CHANGE UP (ref 0.7–1.5)
EGFR: 82 ML/MIN/1.73M2 — SIGNIFICANT CHANGE UP
EOSINOPHIL # BLD AUTO: 0.26 K/UL — SIGNIFICANT CHANGE UP (ref 0–0.7)
EOSINOPHIL NFR BLD AUTO: 1.7 % — SIGNIFICANT CHANGE UP (ref 0–8)
GLUCOSE SERPL-MCNC: 68 MG/DL — LOW (ref 70–99)
HCT VFR BLD CALC: 46.4 % — SIGNIFICANT CHANGE UP (ref 37–47)
HGB BLD-MCNC: 15.6 G/DL — SIGNIFICANT CHANGE UP (ref 12–16)
LYMPHOCYTES # BLD AUTO: 21.7 % — SIGNIFICANT CHANGE UP (ref 20.5–51.1)
LYMPHOCYTES # BLD AUTO: 3.26 K/UL — SIGNIFICANT CHANGE UP (ref 1.2–3.4)
MAGNESIUM SERPL-MCNC: 2 MG/DL — SIGNIFICANT CHANGE UP (ref 1.8–2.4)
MCHC RBC-ENTMCNC: 33.6 G/DL — SIGNIFICANT CHANGE UP (ref 32–37)
MCHC RBC-ENTMCNC: 36.3 PG — HIGH (ref 27–31)
MCV RBC AUTO: 107.9 FL — HIGH (ref 81–99)
MONOCYTES # BLD AUTO: 0.92 K/UL — HIGH (ref 0.1–0.6)
MONOCYTES NFR BLD AUTO: 6.1 % — SIGNIFICANT CHANGE UP (ref 1.7–9.3)
NEUTROPHILS # BLD AUTO: 9.92 K/UL — HIGH (ref 1.4–6.5)
NEUTROPHILS NFR BLD AUTO: 66.1 % — SIGNIFICANT CHANGE UP (ref 42.2–75.2)
PLATELET # BLD AUTO: 394 K/UL — SIGNIFICANT CHANGE UP (ref 130–400)
PMV BLD: 8.8 FL — SIGNIFICANT CHANGE UP (ref 7.4–10.4)
POTASSIUM SERPL-MCNC: 4.6 MMOL/L — SIGNIFICANT CHANGE UP (ref 3.5–5)
POTASSIUM SERPL-SCNC: 4.6 MMOL/L — SIGNIFICANT CHANGE UP (ref 3.5–5)
PROT SERPL-MCNC: 5.5 G/DL — LOW (ref 6–8)
RBC # BLD: 4.3 M/UL — SIGNIFICANT CHANGE UP (ref 4.2–5.4)
RBC # FLD: 14.8 % — HIGH (ref 11.5–14.5)
SODIUM SERPL-SCNC: 140 MMOL/L — SIGNIFICANT CHANGE UP (ref 135–146)
WBC # BLD: 15 K/UL — HIGH (ref 4.8–10.8)
WBC # FLD AUTO: 15 K/UL — HIGH (ref 4.8–10.8)

## 2024-11-09 PROCEDURE — 99232 SBSQ HOSP IP/OBS MODERATE 35: CPT

## 2024-11-09 RX ORDER — CHLORHEXIDINE GLUCONATE 40 MG/ML
1 SOLUTION TOPICAL DAILY
Refills: 0 | Status: DISCONTINUED | OUTPATIENT
Start: 2024-11-09 | End: 2024-11-10

## 2024-11-09 RX ADMIN — LIDOCAINE HYDROCHLORIDE 1 PATCH: 40 SOLUTION TOPICAL at 11:12

## 2024-11-09 RX ADMIN — Medication 0.5 MILLIGRAM(S): at 21:39

## 2024-11-09 RX ADMIN — Medication 1000 MILLIGRAM(S): at 14:22

## 2024-11-09 RX ADMIN — Medication 0.5 MILLIGRAM(S): at 05:26

## 2024-11-09 RX ADMIN — METHOCARBAMOL 500 MILLIGRAM(S): 500 TABLET ORAL at 21:39

## 2024-11-09 RX ADMIN — Medication 2 TABLET(S): at 21:39

## 2024-11-09 RX ADMIN — Medication 0.5 MILLIGRAM(S): at 13:20

## 2024-11-09 RX ADMIN — Medication 1000 MILLIGRAM(S): at 21:40

## 2024-11-09 RX ADMIN — Medication 1000 MILLIGRAM(S): at 05:26

## 2024-11-09 RX ADMIN — CHLORHEXIDINE GLUCONATE 1 APPLICATION(S): 40 SOLUTION TOPICAL at 17:32

## 2024-11-09 RX ADMIN — PANTOPRAZOLE SODIUM 40 MILLIGRAM(S): 40 TABLET, DELAYED RELEASE ORAL at 05:26

## 2024-11-09 RX ADMIN — Medication 1000 MILLIGRAM(S): at 13:20

## 2024-11-09 RX ADMIN — METHOCARBAMOL 500 MILLIGRAM(S): 500 TABLET ORAL at 05:26

## 2024-11-09 RX ADMIN — LIDOCAINE HYDROCHLORIDE 1 PATCH: 40 SOLUTION TOPICAL at 22:13

## 2024-11-09 RX ADMIN — METHOCARBAMOL 500 MILLIGRAM(S): 500 TABLET ORAL at 13:20

## 2024-11-09 NOTE — PROGRESS NOTE ADULT - ASSESSMENT
63 year old female with PMH of COPD and asthma  (not on home O2), active smoker, hx multiple falls  presents to ED c/o weakness and difficulty ambulating since last week.     # S2 fracture , non displaced w/ edema   # Bilateral Rib fractures  # Compression deformity of the superior endplate of L2 of indeterminate age  --Neurology consult appreciated-Patient can follow up outpatient with neurologist, Dr. Greene, for previously ordered EMG and ALIA scan.  - MR Brain/C-spine reviewed. Patient can follow up outpatient with neurologist, Dr. Greene, for previously ordered EMG and ALIA scan.  -MR LS:  IMPRESSION:  Nondisplaced acute fracture in the S2 sacral body with extensive sacral   edema, new since the prior lumbar MRI from 4/18/2024. Adjacent sacral   epidural fluid signal results in moderate narrowing of the thecal sac.   The finding could represent edema and/or epidural hematoma.  Unchanged multilevel lumbar spondylosis and spondylolisthesis, most   severe at L5-S1 with grade 2 anterolisthesis and severe bilateral   foraminal stenosis with impingement of the exiting bilateral L5 nerve   roots.Unchanged L4-5 moderate-severe left/moderate right foraminal stenosis..  - - Discussed with neurosurgery team 11/6 -  NWB for now   - planned for OR cementing/ fixation later this week - likely next monday  -  cw pain control + prednisone , bowel regimen     # Right kidney 1.9 cm angiomyolipoma  - noted on CT  - outpatient follow up with urology    # H/O COPD/Asthma  - not on home O2  - continue albuterol PRN    #H/O Anxiety-started Xanax    # H/O Nicotine dependence - refused nicotine patch/gum - smoking cessation counseling     DVT Px on heparin   GI-- protonix   Patient has good exercise tolerance-- has been a smoker till she came here.  EKG- sinus tachycardia rate 105/min- no cardiac issues known and no family hx of CAD   moderate risk for procedure-- she walked today with PT for several feet but no documentation--   Surgery maynot be needed spoke with neurosurgery-- Will DC patient in AM if she continues to have no pain.

## 2024-11-09 NOTE — PROGRESS NOTE ADULT - SUBJECTIVE AND OBJECTIVE BOX
SUBJECTIVE:    Patient is a 63y old Female who presents with a chief complaint of s/p fall (07 Nov 2024 02:52)    Currently admitted to medicine with the primary diagnosis of Ambulatory dysfunction       Today is hospital day 10d.     PAST MEDICAL & SURGICAL HISTORY  Asthma    Chronic obstructive pulmonary disease      ALLERGIES:  ampicillin (Unknown)    MEDICATIONS:  STANDING MEDICATIONS  acetaminophen     Tablet .. 1000 milliGRAM(s) Oral every 8 hours  ALPRAZolam 0.5 milliGRAM(s) Oral three times a day  heparin   Injectable 5000 Unit(s) SubCutaneous every 8 hours  influenza  Vaccine (HIGH DOSE) 0.5 milliLiter(s) IntraMuscular once  lidocaine   4% Patch 1 Patch Transdermal daily  methocarbamol 500 milliGRAM(s) Oral three times a day  pantoprazole    Tablet 40 milliGRAM(s) Oral before breakfast  polyethylene glycol 3350 17 Gram(s) Oral daily  senna 2 Tablet(s) Oral at bedtime    PRN MEDICATIONS  albuterol    90 MICROgram(s) HFA Inhaler 2 Puff(s) Inhalation every 6 hours PRN  oxyCODONE    IR 5 milliGRAM(s) Oral every 4 hours PRN    VITALS:   T(F): 97.4  HR: 76  BP: 131/78  RR: 18  SpO2: 96%    LABS:                        15.6   15.00 )-----------( 394      ( 09 Nov 2024 05:56 )             46.4     11-09    140  |  107  |  26[H]  ----------------------------<  68[L]  4.6   |  24  |  0.8    Ca    9.6      09 Nov 2024 05:56  Mg     2.0     11-09    TPro  5.5[L]  /  Alb  3.2[L]  /  TBili  <0.2  /  DBili  x   /  AST  21  /  ALT  23  /  AlkPhos  258[H]  11-09      Urinalysis Basic - ( 09 Nov 2024 05:56 )    Color: x / Appearance: x / SG: x / pH: x  Gluc: 68 mg/dL / Ketone: x  / Bili: x / Urobili: x   Blood: x / Protein: x / Nitrite: x   Leuk Esterase: x / RBC: x / WBC x   Sq Epi: x / Non Sq Epi: x / Bacteria: x                RADIOLOGY:    PHYSICAL EXAM:  GEN: No acute distress  LUNGS: Clear to auscultation bilaterally   HEART: S1/S2 present. RRR.   ABD/ GI: Soft, non-tender, non-distended. Bowel sounds present  EXT: NC/NC/NE/2+PP/MCADAMS  NEURO: AAOX3

## 2024-11-09 NOTE — CHART NOTE - NSCHARTNOTEFT_GEN_A_CORE
Dr Polk examined at spoke to the patient she is ambulating with a Rolling walker and feeling better she wishes to go home and follow up as an outpatient , sent a message to the  office to callher and give an appt Tuesday 11/12 with Dr Avalos
Lois Thomas will be transferred to [facility] for Acute compression fracture at the sacrum level associated with edema. pt is failing medical tt. Persistent severe pain despite aggressive pain management.   Therefore, pt will be transferred to [place] for surgical intervention  - Switch tylenol to 1000mg po q8hr around the clock  - Switch percocet to oxydocone 5mg po q4hr prn
MR Brain/C-spine reviewed. Patient can follow up outpatient with neurologist, Dr. Greene, for previously ordered EMG and ALIA scan.    Discussed with attending Dr Thompson
patient had a fall and had an MRI of Lspine     < from: MR Lumbar Spine No Cont (11.04.24 @ 14:57) >    IMPRESSION:    Nondisplaced acute fracture in the S2 sacral body with extensive sacral   edema, new since the prior lumbar MRI from 4/18/2024. Adjacent sacral   epidural fluid signal results in moderate narrowing of the thecal sac.   The finding could represent edema and/or epidural hematoma.    Unchanged multilevel lumbar spondylosis and spondylolisthesis, most   severe at L5-S1 with grade 2 anterolisthesis and severe bilateral   foraminal stenosis with impingement of the exiting bilateral L5 nerve   roots.    discussed with Dr Avalos he would like to obtain a standing Xray of Lspine and a CT of the Lumbar spine without contrast thank you will follow

## 2024-11-10 VITALS
OXYGEN SATURATION: 99 % | RESPIRATION RATE: 18 BRPM | DIASTOLIC BLOOD PRESSURE: 96 MMHG | TEMPERATURE: 98 F | SYSTOLIC BLOOD PRESSURE: 165 MMHG | HEART RATE: 84 BPM

## 2024-11-10 PROCEDURE — 99239 HOSP IP/OBS DSCHRG MGMT >30: CPT

## 2024-11-10 RX ORDER — ALBUTEROL 90 MCG
3 AEROSOL (GRAM) INHALATION
Qty: 4 | Refills: 0
Start: 2024-11-10

## 2024-11-10 RX ORDER — POLYETHYLENE GLYCOL 3350 17 G/17G
17 POWDER, FOR SOLUTION ORAL
Qty: 255 | Refills: 0
Start: 2024-11-10 | End: 2024-11-24

## 2024-11-10 RX ORDER — PANTOPRAZOLE SODIUM 40 MG/1
1 TABLET, DELAYED RELEASE ORAL
Qty: 12 | Refills: 0
Start: 2024-11-10

## 2024-11-10 RX ORDER — SENNA 187 MG
2 TABLET ORAL
Qty: 40 | Refills: 0
Start: 2024-11-10 | End: 2024-11-29

## 2024-11-10 RX ORDER — METHOCARBAMOL 500 MG/1
1 TABLET ORAL
Qty: 60 | Refills: 0
Start: 2024-11-10 | End: 2024-11-29

## 2024-11-10 RX ORDER — LIDOCAINE HYDROCHLORIDE 40 MG/ML
1 SOLUTION TOPICAL
Qty: 10 | Refills: 0
Start: 2024-11-10

## 2024-11-10 RX ORDER — IPRATROPIUM BROMIDE AND ALBUTEROL SULFATE .5; 2.5 MG/3ML; MG/3ML
3 SOLUTION RESPIRATORY (INHALATION) ONCE
Refills: 0 | Status: COMPLETED | OUTPATIENT
Start: 2024-11-10 | End: 2024-11-10

## 2024-11-10 RX ORDER — CELECOXIB 100 MG
1 CAPSULE ORAL
Qty: 20 | Refills: 0
Start: 2024-11-10 | End: 2024-11-19

## 2024-11-10 RX ADMIN — Medication 1000 MILLIGRAM(S): at 13:19

## 2024-11-10 RX ADMIN — CHLORHEXIDINE GLUCONATE 1 APPLICATION(S): 40 SOLUTION TOPICAL at 11:21

## 2024-11-10 RX ADMIN — Medication 1000 MILLIGRAM(S): at 06:23

## 2024-11-10 RX ADMIN — METHOCARBAMOL 500 MILLIGRAM(S): 500 TABLET ORAL at 13:20

## 2024-11-10 RX ADMIN — METHOCARBAMOL 500 MILLIGRAM(S): 500 TABLET ORAL at 05:56

## 2024-11-10 RX ADMIN — Medication 1000 MILLIGRAM(S): at 13:20

## 2024-11-10 RX ADMIN — Medication 1000 MILLIGRAM(S): at 05:56

## 2024-11-10 RX ADMIN — PANTOPRAZOLE SODIUM 40 MILLIGRAM(S): 40 TABLET, DELAYED RELEASE ORAL at 05:55

## 2024-11-10 RX ADMIN — Medication 0.5 MILLIGRAM(S): at 13:19

## 2024-11-10 RX ADMIN — IPRATROPIUM BROMIDE AND ALBUTEROL SULFATE 3 MILLILITER(S): .5; 2.5 SOLUTION RESPIRATORY (INHALATION) at 10:43

## 2024-11-10 RX ADMIN — Medication 0.5 MILLIGRAM(S): at 05:56

## 2024-11-10 RX ADMIN — LIDOCAINE HYDROCHLORIDE 1 PATCH: 40 SOLUTION TOPICAL at 11:21

## 2024-11-10 NOTE — DISCHARGE NOTE NURSING/CASE MANAGEMENT/SOCIAL WORK - PATIENT PORTAL LINK FT
You can access the FollowMyHealth Patient Portal offered by [facility] by registering at the following website: [email or web address]. By joining [employer]’s FollowMyHealth portal, you will also be able to view your health information using other applications (apps) compatible with our system.

## 2024-11-10 NOTE — DISCHARGE NOTE NURSING/CASE MANAGEMENT/SOCIAL WORK - NSDCPEFALRISK_GEN_ALL_CORE
For information on Fall & Injury Prevention, visit: [email or web address] OR  [email or web address] OR  https://www.cdc.gov/steadi/patient.html

## 2024-11-10 NOTE — DISCHARGE NOTE NURSING/CASE MANAGEMENT/SOCIAL WORK - FINANCIAL ASSISTANCE
[facility] provides services at a reduced cost to those who are determined to be eligible through [facility]’s financial assistance program. Information regarding [facility]’s financial assistance program can be found by going to [email or web address] or by calling [phone].
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
## 2024-11-10 NOTE — PROGRESS NOTE ADULT - ASSESSMENT
63 year old female with PMH of COPD and asthma  (not on home O2), active smoker, hx multiple falls  presents to ED c/o weakness and difficulty ambulating since last week.     # S2 fracture , non displaced w/ edema   # Bilateral Rib fractures  # Compression deformity of the superior endplate of L2 of indeterminate age  --Neurology consult appreciated-Patient can follow up outpatient with neurologist, Dr. Greene, for previously ordered EMG and ALIA scan.  - MR Brain/C-spine reviewed.  -MR LS:  IMPRESSION:  Nondisplaced acute fracture in the S2 sacral body with extensive sacral   edema, new since the prior lumbar MRI from 4/18/2024. Adjacent sacral   epidural fluid signal results in moderate narrowing of the thecal sac.   The finding could represent edema and/or epidural hematoma.  Unchanged multilevel lumbar spondylosis and spondylolisthesis, most   severe at L5-S1 with grade 2 anterolisthesis and severe bilateral   foraminal stenosis with impingement of the exiting bilateral L5 nerve   roots.Unchanged L4-5 moderate-severe left/moderate right foraminal stenosis..  - - Discussed with neurosurgery team  -  patient walked with PT. she will see Dr Avalos as outpatient  next appointment is tuesaday  -   pain control  Dced prednisone, bowel regimen     # Right kidney 1.9 cm angiomyolipoma  - noted on CT  - outpatient follow up with urology    # H/O COPD/Asthma  - not on home O2  - continue albuterol PRN  - Albuterol nebulizer tretament today -- smokers cough    #H/O Anxiety-DCXanax    # H/O Nicotine dependence - refused nicotine patch/gum - smoking cessation counseling     DVT Px on heparin   GI-- protonix   Patient has good exercise tolerance-- has been a smoker till she came here.   EKG- sinus tachycardia rate 105/min- no cardiac issues known and no family hx of CAD   moderate risk for procedure-- she walked today with PT for several feet but no documentation--   Surgery maynot be needed but will see Dr Avalos on tuesday. AK home today--spent more than 30mins.

## 2024-11-10 NOTE — PROGRESS NOTE ADULT - PROVIDER SPECIALTY LIST ADULT
Hospitalist
Neurosurgery
Hospitalist
Internal Medicine

## 2024-11-10 NOTE — DISCHARGE NOTE NURSING/CASE MANAGEMENT/SOCIAL WORK - NSDCVIVACCINE_GEN_ALL_CORE_FT
Tdap; 19-Dec-2020 12:03; Mel Pugh (SIN); [employer]; [id number] (Exp. Date: 31-Jul-2022); IntraMuscular; Deltoid Left.; 0.5 milliLiter(s); VIS (VIS Published: 09-May-2013, VIS Presented: 19-Dec-2020);

## 2024-11-10 NOTE — DISCHARGE NOTE PROVIDER - CARE PROVIDER_API CALL
Dinesh Avalos  Neurosurgery  [address] [place] [address], [address]  [place] [address]  Phone: [phone]  Fax: [phone]  Follow Up Time:

## 2024-11-10 NOTE — DISCHARGE NOTE PROVIDER - NSDCCPCAREPLAN_GEN_ALL_CORE_FT
PRINCIPAL DISCHARGE DIAGNOSIS  Diagnosis: Closed sacral fracture  Assessment and Plan of Treatment: You had sacral fracture at level of S2 and you were seen by neurosurgery who initially advised surgery but since you did well after ambulation -- you are being discharged-- you need to follow with Dr Avalos on tuesday. Please also continue to follow with your neurologist outpatient.      SECONDARY DISCHARGE DIAGNOSES  Diagnosis: COPD with bronchial hyperresponsiveness  Assessment and Plan of Treatment: smoking cessation is advised

## 2024-11-10 NOTE — DISCHARGE NOTE PROVIDER - NSDCMRMEDTOKEN_GEN_ALL_CORE_FT
albuterol 2.5 mg/3 mL (0.083%) inhalation solution: 3 milliliter(s) by nebulizer 2 times a day as needed for  congestion  celecoxib 200 mg oral capsule: 1 cap(s) orally 2 times a day as needed for  moderate pain  lidocaine 5% topical film: Apply topically to affected area once a day  methocarbamol 500 mg oral tablet: 1 tab(s) orally 3 times a day  polyethylene glycol 3350 oral powder for reconstitution: 17 gram(s) orally once a day  Protonix 40 mg oral delayed release tablet: 1 tab(s) orally once a day  senna leaf extract oral tablet: 2 tab(s) orally once a day (at bedtime)

## 2024-11-12 ENCOUNTER — APPOINTMENT (OUTPATIENT)
Dept: CARE COORDINATION | Facility: HOME HEALTH | Age: 64
End: 2024-11-12
Payer: MEDICARE

## 2024-11-12 DIAGNOSIS — K59.09 OTHER CONSTIPATION: ICD-10-CM

## 2024-11-12 DIAGNOSIS — J44.9 CHRONIC OBSTRUCTIVE PULMONARY DISEASE, UNSPECIFIED: ICD-10-CM

## 2024-11-12 DIAGNOSIS — F17.200 NICOTINE DEPENDENCE, UNSPECIFIED, UNCOMPLICATED: ICD-10-CM

## 2024-11-12 DIAGNOSIS — K21.9 GASTRO-ESOPHAGEAL REFLUX DISEASE W/OUT ESOPHAGITIS: ICD-10-CM

## 2024-11-12 DIAGNOSIS — S32.10XA UNSPECIFIED FRACTURE OF SACRUM, INITIAL ENCOUNTER FOR CLOSED FRACTURE: ICD-10-CM

## 2024-11-12 PROCEDURE — 99349 HOME/RES VST EST MOD MDM 40: CPT

## 2024-11-13 VITALS — DIASTOLIC BLOOD PRESSURE: 60 MMHG | HEART RATE: 104 BPM | SYSTOLIC BLOOD PRESSURE: 120 MMHG

## 2024-11-13 PROBLEM — K59.09 CHRONIC CONSTIPATION: Status: ACTIVE | Noted: 2024-11-13

## 2024-11-13 PROBLEM — S32.10XA SACRAL FRACTURE: Status: ACTIVE | Noted: 2024-11-13

## 2024-11-13 PROBLEM — F17.200 CURRENT EVERY DAY SMOKER: Status: ACTIVE | Noted: 2024-11-13

## 2024-11-13 PROBLEM — J44.9 CHRONIC OBSTRUCTIVE PULMONARY DISEASE, UNSPECIFIED COPD TYPE: Status: ACTIVE | Noted: 2024-11-13

## 2024-11-13 PROBLEM — K21.9 CHRONIC GERD: Status: ACTIVE | Noted: 2024-11-13

## 2024-11-13 RX ORDER — LIDOCAINE 40 MG/G
4 PATCH TOPICAL
Qty: 1 | Refills: 0 | Status: ACTIVE | COMMUNITY
Start: 2024-11-13 | End: 1900-01-01

## 2024-11-13 RX ORDER — SENNOSIDES 8.6MG AND DOCUSATE SODIUM 50MG 8.6; 5 MG/1; MG/1
8.6-5 TABLET, FILM COATED ORAL AT BEDTIME
Refills: 0 | Status: ACTIVE | COMMUNITY
Start: 2024-11-13

## 2024-11-13 RX ORDER — PANTOPRAZOLE SODIUM 40 MG/1
40 TABLET, DELAYED RELEASE ORAL DAILY
Refills: 0 | Status: ACTIVE | COMMUNITY
Start: 2024-11-13

## 2024-11-13 RX ORDER — ALBUTEROL SULFATE 2.5 MG/3ML
(2.5 MG/3ML) SOLUTION RESPIRATORY (INHALATION)
Refills: 0 | Status: ACTIVE | COMMUNITY
Start: 2024-11-13

## 2024-11-13 RX ORDER — METHOCARBAMOL 500 MG/1
500 TABLET, FILM COATED ORAL 3 TIMES DAILY
Qty: 90 | Refills: 0 | Status: ACTIVE | COMMUNITY
Start: 2024-11-13

## 2024-11-13 RX ORDER — SOFT LENS DISINFECTANT
SOLUTION, NON-ORAL MISCELLANEOUS
Qty: 1 | Refills: 0 | Status: ACTIVE | COMMUNITY
Start: 2024-11-13 | End: 1900-01-01

## 2024-11-13 RX ORDER — ACETAMINOPHEN 80 MG
17 TABLET,CHEWABLE ORAL DAILY
Qty: 1 | Refills: 0 | Status: ACTIVE | COMMUNITY
Start: 2024-11-13

## 2024-11-15 DIAGNOSIS — S22.43XA MULTIPLE FRACTURES OF RIBS, BILATERAL, INITIAL ENCOUNTER FOR CLOSED FRACTURE: ICD-10-CM

## 2024-11-15 DIAGNOSIS — R26.2 DIFFICULTY IN WALKING, NOT ELSEWHERE CLASSIFIED: ICD-10-CM

## 2024-11-15 DIAGNOSIS — M43.17 SPONDYLOLISTHESIS, LUMBOSACRAL REGION: ICD-10-CM

## 2024-11-15 DIAGNOSIS — E78.5 HYPERLIPIDEMIA, UNSPECIFIED: ICD-10-CM

## 2024-11-15 DIAGNOSIS — F17.200 NICOTINE DEPENDENCE, UNSPECIFIED, UNCOMPLICATED: ICD-10-CM

## 2024-11-15 DIAGNOSIS — J44.89 OTHER SPECIFIED CHRONIC OBSTRUCTIVE PULMONARY DISEASE: ICD-10-CM

## 2024-11-15 DIAGNOSIS — S32.029A UNSPECIFIED FRACTURE OF SECOND LUMBAR VERTEBRA, INITIAL ENCOUNTER FOR CLOSED FRACTURE: ICD-10-CM
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
## 2024-11-15 DIAGNOSIS — M43.10 SPONDYLOLISTHESIS, SITE UNSPECIFIED: ICD-10-CM

## 2024-11-15 DIAGNOSIS — I10 ESSENTIAL (PRIMARY) HYPERTENSION: ICD-10-CM

## 2024-11-15 DIAGNOSIS — D75.1 SECONDARY POLYCYTHEMIA: ICD-10-CM

## 2024-11-15 DIAGNOSIS — D17.71 BENIGN LIPOMATOUS NEOPLASM OF KIDNEY: ICD-10-CM

## 2024-11-15 DIAGNOSIS — G25.2 OTHER SPECIFIED FORMS OF TREMOR: ICD-10-CM

## 2024-11-15 DIAGNOSIS — E11.9 TYPE 2 DIABETES MELLITUS WITHOUT COMPLICATIONS: ICD-10-CM
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
## 2024-11-19 ENCOUNTER — APPOINTMENT (OUTPATIENT)
Dept: NEUROSURGERY | Facility: CLINIC | Age: 64
End: 2024-11-19

## 2024-11-27 ENCOUNTER — APPOINTMENT (OUTPATIENT)
Dept: NEUROLOGY | Facility: CLINIC | Age: 64
End: 2024-11-27
Payer: MEDICARE

## 2024-11-27 PROCEDURE — 99213 OFFICE O/P EST LOW 20 MIN: CPT

## 2024-11-27 RX ORDER — METHYLPREDNISOLONE 4 MG/1
4 TABLET ORAL
Qty: 1 | Refills: 2 | Status: ACTIVE | COMMUNITY
Start: 2024-11-27 | End: 1900-01-01

## 2024-12-02 ENCOUNTER — APPOINTMENT (OUTPATIENT)
Dept: NEUROLOGY | Facility: CLINIC | Age: 64
End: 2024-12-02
Payer: MEDICARE

## 2024-12-02 PROCEDURE — 95886 MUSC TEST DONE W/N TEST COMP: CPT

## 2024-12-02 PROCEDURE — 95912 NRV CNDJ TEST 11-12 STUDIES: CPT

## 2024-12-03 ENCOUNTER — APPOINTMENT (OUTPATIENT)
Dept: PAIN MANAGEMENT | Facility: CLINIC | Age: 64
End: 2024-12-03
Payer: MEDICARE

## 2024-12-03 DIAGNOSIS — M79.2 NEURALGIA AND NEURITIS, UNSPECIFIED: ICD-10-CM

## 2024-12-03 DIAGNOSIS — S32.10XA UNSPECIFIED FRACTURE OF SACRUM, INITIAL ENCOUNTER FOR CLOSED FRACTURE: ICD-10-CM

## 2024-12-03 DIAGNOSIS — M54.50 LOW BACK PAIN, UNSPECIFIED: ICD-10-CM

## 2024-12-03 PROCEDURE — 99203 OFFICE O/P NEW LOW 30 MIN: CPT

## 2024-12-03 RX ORDER — GABAPENTIN 100 MG/1
100 CAPSULE ORAL 3 TIMES DAILY
Qty: 90 | Refills: 1 | Status: ACTIVE | COMMUNITY
Start: 2024-12-03 | End: 1900-01-01

## 2024-12-03 RX ORDER — MELOXICAM 15 MG/1
15 TABLET ORAL DAILY
Qty: 30 | Refills: 0 | Status: ACTIVE | COMMUNITY
Start: 2024-12-03 | End: 1900-01-01

## 2024-12-10 ENCOUNTER — APPOINTMENT (OUTPATIENT)
Dept: NEUROSURGERY | Facility: CLINIC | Age: 64
End: 2024-12-10
Payer: MEDICARE

## 2024-12-10 ENCOUNTER — NON-APPOINTMENT (OUTPATIENT)
Age: 64
End: 2024-12-10

## 2024-12-10 VITALS — WEIGHT: 130 LBS | HEIGHT: 63 IN | BODY MASS INDEX: 23.04 KG/M2

## 2024-12-10 DIAGNOSIS — S32.10XA UNSPECIFIED FRACTURE OF SACRUM, INITIAL ENCOUNTER FOR CLOSED FRACTURE: ICD-10-CM

## 2024-12-10 DIAGNOSIS — M43.17 SPONDYLOLISTHESIS, LUMBOSACRAL REGION: ICD-10-CM

## 2024-12-10 PROCEDURE — 99214 OFFICE O/P EST MOD 30 MIN: CPT

## 2024-12-17 PROBLEM — M43.17 ACQUIRED SPONDYLOLISTHESIS OF LUMBOSACRAL REGION: Status: ACTIVE | Noted: 2024-12-17

## 2024-12-30 ENCOUNTER — RX RENEWAL (OUTPATIENT)
Age: 64
End: 2024-12-30

## 2024-12-31 ENCOUNTER — APPOINTMENT (OUTPATIENT)
Dept: NEUROLOGY | Facility: CLINIC | Age: 64
End: 2024-12-31

## 2025-01-14 ENCOUNTER — APPOINTMENT (OUTPATIENT)
Dept: PAIN MANAGEMENT | Facility: CLINIC | Age: 65
End: 2025-01-14

## 2025-07-16 ENCOUNTER — APPOINTMENT (OUTPATIENT)
Dept: NEUROLOGY | Facility: CLINIC | Age: 65
End: 2025-07-16